# Patient Record
Sex: FEMALE | Race: BLACK OR AFRICAN AMERICAN | NOT HISPANIC OR LATINO | Employment: FULL TIME | URBAN - NONMETROPOLITAN AREA
[De-identification: names, ages, dates, MRNs, and addresses within clinical notes are randomized per-mention and may not be internally consistent; named-entity substitution may affect disease eponyms.]

---

## 2018-02-13 ENCOUNTER — DOCUMENTATION ONLY (OUTPATIENT)
Dept: FAMILY MEDICINE | Facility: OTHER | Age: 23
End: 2018-02-13

## 2018-02-13 PROBLEM — Z97.5 NEXPLANON IN PLACE: Status: ACTIVE | Noted: 2017-05-02

## 2018-02-13 PROBLEM — Z78.9 NONIMMUNE TO HEPATITIS B VIRUS: Status: ACTIVE | Noted: 2017-05-04

## 2018-02-13 PROBLEM — F51.04 PSYCHOPHYSIOLOGICAL INSOMNIA: Status: ACTIVE | Noted: 2017-03-14

## 2018-02-13 PROBLEM — F31.9 BIPOLAR I DISORDER (H): Status: ACTIVE | Noted: 2017-03-14

## 2018-02-13 PROBLEM — Z72.51 HIGH RISK SEXUAL BEHAVIOR: Status: ACTIVE | Noted: 2017-05-02

## 2018-02-13 RX ORDER — IBUPROFEN 200 MG
200 TABLET ORAL 4 TIMES DAILY PRN
COMMUNITY
Start: 2016-11-09

## 2021-09-10 ENCOUNTER — NURSE TRIAGE (OUTPATIENT)
Dept: ADMINISTRATIVE | Facility: CLINIC | Age: 26
End: 2021-09-10

## 2021-09-12 ENCOUNTER — OFFICE VISIT (OUTPATIENT)
Dept: FAMILY MEDICINE | Facility: OTHER | Age: 26
End: 2021-09-12
Attending: PHYSICIAN ASSISTANT

## 2021-09-12 VITALS
HEART RATE: 63 BPM | HEIGHT: 62 IN | BODY MASS INDEX: 29.26 KG/M2 | TEMPERATURE: 99 F | WEIGHT: 159 LBS | RESPIRATION RATE: 18 BRPM | DIASTOLIC BLOOD PRESSURE: 84 MMHG | SYSTOLIC BLOOD PRESSURE: 130 MMHG | OXYGEN SATURATION: 99 %

## 2021-09-12 DIAGNOSIS — J06.9 UPPER RESPIRATORY TRACT INFECTION, UNSPECIFIED TYPE: Primary | ICD-10-CM

## 2021-09-12 PROCEDURE — 99203 OFFICE O/P NEW LOW 30 MIN: CPT | Performed by: PHYSICIAN ASSISTANT

## 2021-09-12 PROCEDURE — C9803 HOPD COVID-19 SPEC COLLECT: HCPCS

## 2021-09-12 PROCEDURE — U0005 INFEC AGEN DETEC AMPLI PROBE: HCPCS | Mod: ZL | Performed by: PHYSICIAN ASSISTANT

## 2021-09-12 RX ORDER — ALBUTEROL SULFATE 90 UG/1
2 AEROSOL, METERED RESPIRATORY (INHALATION) EVERY 6 HOURS PRN
Qty: 8.5 G | Refills: 0 | Status: SHIPPED | OUTPATIENT
Start: 2021-09-12 | End: 2022-11-29

## 2021-09-12 RX ORDER — BENZONATATE 200 MG/1
200 CAPSULE ORAL 3 TIMES DAILY PRN
Qty: 30 CAPSULE | Refills: 0 | Status: SHIPPED | OUTPATIENT
Start: 2021-09-12 | End: 2022-11-29

## 2021-09-12 RX ORDER — ESCITALOPRAM OXALATE 10 MG/1
TABLET ORAL
COMMUNITY
Start: 2020-09-25 | End: 2022-11-29

## 2021-09-12 RX ORDER — ALBUTEROL SULFATE 90 UG/1
2 AEROSOL, METERED RESPIRATORY (INHALATION)
COMMUNITY
Start: 2020-07-13 | End: 2022-11-29

## 2021-09-12 ASSESSMENT — PAIN SCALES - GENERAL: PAINLEVEL: MODERATE PAIN (5)

## 2021-09-12 ASSESSMENT — MIFFLIN-ST. JEOR: SCORE: 1429.47

## 2021-09-12 NOTE — NURSING NOTE
Patient presents to clinic experienicing productive cough, sinus drainage, fatigue and chest congestion for past week.   Medication Reconciliation: complete    Brianna Bazan LPN

## 2021-09-13 NOTE — PROGRESS NOTES
ASSESSMENT/PLAN:    I have reviewed the nursing notes.  I have reviewed the findings, diagnosis, plan and need for follow up with the patient.    1. Upper respiratory tract infection, unspecified type  - albuterol (PROAIR HFA/PROVENTIL HFA/VENTOLIN HFA) 108 (90 Base) MCG/ACT inhaler; Inhale 2 puffs into the lungs every 6 hours as needed for shortness of breath / dyspnea or wheezing  Dispense: 8.5 g; Refill: 0  - Symptomatic COVID-19 Virus (Coronavirus) by PCR Nose  - benzonatate (TESSALON) 200 MG capsule; Take 1 capsule (200 mg) by mouth 3 times daily as needed for cough  Dispense: 30 capsule; Refill: 0  - Vital signs stable. PE consistent with URI. COVID test in process at this time, we recommend that they remain in quarantine until results return, which typically takes 24-72 hours. If COVID testing is negative, symptoms are improving (no need for antipyretics, etc.), that patient can return to normal ADLs, if COVID test returns positive, recommend quarantine for 10-14 days from symptom onset. Recommend: increased fluids, rest, use of humidifier, antitussives (cough medication), alternating tylenol and ibuprofen every 4-6 hours as needed (if able to take these medications), salt water gargles for sore throats or throat lozenges, honey, netti pots/saline rinses for sinus/congestion, as well as other home remedies.     Patient is in agreement and understanding of the above treatment plan. All questions and concerns were addressed and answered to patient's satisfaction. AVS reviewed with patient.     Discussed warning signs/symptoms indicative of need to f/u    Follow up if symptoms persist or worsen or concerns    I explained my diagnostic considerations and recommendations to the patient, who voiced understanding and agreement with the treatment plan. All questions were answered. We discussed potential side effects of any prescribed or recommended therapies, as well as expectations for response to  treatments.    Jessica Rollins PA-C  9/12/2021  7:22 PM    HPI:    Deysi Moss is a 25 year old female  who presents to [facility] today for concerns of URI, x 1 week    Symptoms:  Yes fevers or chills. Fever, highest reported temperature: low grade  No sore throat/pharyngitis/tonsillitis.   Yes allergy/URI Symptoms  No Muffled Sounds/Change in Hearing  No Sensation of Fullness in Ear(s)  No Ringing in Ears/Tinnitus  No Balance Changes  No Dizziness  Yes Congestion (head/nasal/chest)  Yes Cough/Productive Cough  No Post Nasal Drip  Yes Headache  Yes Sinus Pain/Pressure  No Myalgias  No Otalgia  Activity Level Changes: Yes: fatigue  Appetite/Liquid Intake Changes: No  Changes to Bowel Habits: No  Changes to Bladder Habits: No  Additional Symptoms to Report: No  History of similar symptoms: No  Prior workup: No    Treatments tried: Tylenol/Ibuprofen, Decongestants, OTC Cough med, Fluids and Rest    Site of exposure: not known.  Type of exposure: not known    Cardiopulmonary History:  Recent Infections (Pneumonia, etc): No  Asthma: No  Additional History: No  Other Pertinent History: seasonal allergies    PCP: Not local    Past Medical History:   Diagnosis Date     Urinary tract infection     No Comments Provided     Past Surgical History:   Procedure Laterality Date     OTHER SURGICAL HISTORY      54625.0,PAST SURGICAL HISTORY,Unremarkable     Social History     Tobacco Use     Smoking status: Never Smoker     Smokeless tobacco: Never Used     Tobacco comment: Quit smoking: Mother smoked when she was younger; no longer exposed   Substance Use Topics     Alcohol use: Yes     Alcohol/week: 0.0 standard drinks     Comment: Alcoholic Drinks/day: 1-2 glasses of wine per month     Current Outpatient Medications   Medication Sig Dispense Refill     albuterol (PROAIR HFA/PROVENTIL HFA/VENTOLIN HFA) 108 (90 Base) MCG/ACT inhaler Inhale 2 puffs into the lungs       cholecalciferol (D3-50) 81073 UNITS capsule Take  "50,000 Units by mouth once a week       escitalopram (LEXAPRO) 10 MG tablet TK 1 T PO QD       etonogestrel (NEXPLANON) 68 MG IMPL 1 each (68 mg) by Subdermal route continuous       ibuprofen (ADVIL/MOTRIN) 200 MG tablet Take 200 mg by mouth 4 times daily as needed       melatonin 5 MG tablet Take 5 mg by mouth nightly as needed for sleep       Allergies   Allergen Reactions     Cats Shortness Of Breath     Past medical history, past surgical history, current medications and allergies reviewed and accurate to the best of my knowledge.      ROS:  Refer to HPI    /84 (BP Location: Left arm, Patient Position: Sitting, Cuff Size: Adult Regular)   Pulse 63   Temp 99  F (37.2  C) (Tympanic)   Resp 18   Ht 1.575 m (5' 2\")   Wt 72.1 kg (159 lb)   SpO2 99%   Breastfeeding No   BMI 29.08 kg/m      EXAM:  General Appearance: Well appearing 25-year old female, appropriate appearance for age. No acute distress  Ears: Left TM intact, translucent with bony landmarks appreciated, no erythema, no effusion, no bulging, no purulence.  Right TM intact, translucent with bony landmarks appreciated, no erythema, no effusion, no bulging, no purulence.  Left auditory canal clear.  Right auditory canal clear.  Normal external ears, non tender.  Eyes: conjunctivae normal without erythema or irritation, corneas clear, no drainage or crusting, no eyelid swelling, pupils equal   Orophayrnx: moist mucous membranes, posterior pharynx without erythema, tonsils without hypertrophy, no erythema, no exudates or petechiae, no post nasal drip seen, no trismus, voice clear.    Sinuses:  No sinus tenderness upon palpation of the frontal or maxillary sinuses  Nose:  Bilateral nares: no erythema, no edema, no drainage or congestion   Neck: supple without adenopathy  Respiratory: normal chest wall and respirations.  Normal effort.  Clear to auscultation bilaterally, no wheezing, crackles or rhonchi.  No increased work of breathing.  Dry cough. "   Cardiac: RRR with no murmurs  Abdomen: soft, nontender, no rigidity, no rebound tenderness or guarding, normal bowel sounds present  :  No suprapubic tenderness to palpation.  No CVA tenderness to palpation.    Musculoskeletal:  Equal movement of bilateral upper extremities.  Equal movement of bilateral lower extremities.  Normal gait.    Dermatological: no rashes noted of exposed skin  Psychological: normal affect, alert, oriented, and pleasant.     Labs:  COVID in process    Xray:  None

## 2021-09-13 NOTE — PATIENT INSTRUCTIONS
"If a COVID swab was performed:   Please quarantine until results return.     -If \"NEGATIVE\" and symptoms improving (without need for medication) you are able to return to work/activities of daily limit    -If \"POSITIVE\" please quarantine for 10-14 days from symptom onset    Please refer to your AVS for follow up and pain/symptoms management recommendations (I.e.: medications, helpful conservative treatment modalities, appropriate follow up if need to a specialist or family practice, etc.). Please return to urgent care if your symptoms change or worsen.     Discharge instructions:  -If you were prescribed a medication(s), please take this as prescribed/directed  -Monitor your symptoms, if changing/worsening, return to UC/ER or PCP for follow up    Symptomatic treatments recommended.  -Discussed that antibiotics would not help symptoms of viral URI. Education provided on symptoms of secondary bacterial infection such as new fever, chills, rigors, shortness of breath, increased work of breathing, that can occur with viral URI and need for further evaluation, if they occur.   - Ensure you are staying hydrated by drinking plenty of fluids and eating mild foods and advance diet as tolerated  - Honey can be soothing for sore throat (as long as above 12 months of age)  - Warm salt water gurgles can help soothe sore throat  - Humidifier can help with congestion and help keep mucus membranes such as throat and nose from drying out.  - Sleeping slightly propped up can help with congestion and postnasal drainage that can worsen cough at bedtime.  - As long as you have never been told to take Tylenol and/or Ibuprofen you can use them to manage fever and body aches per package instructions  Make sure you eat when you take ibuprofen to avoid stomach upset.  - OTC cough medications per package instructions to help with cough. Check to see if the cough/cold medication already has acetaminophen (Tylenol) in it. If it does avoid " taking additional Tylenol.  - If sudden onset of new fever, worsening symptoms return for further evaluation.  - OTC antihistamine such as Allegra, Zyrtec, Claritin (generic is okay) can help with nasal/sinus congestion and OTC nasal steroid such as Flonase can help decrease sinus inflammation to help with congestion.  - Education provided on symptoms of post-viral bacterial infections including ear infection and pneumonia. This would require re-evaluation for treatment.

## 2021-09-14 ENCOUNTER — TELEPHONE (OUTPATIENT)
Dept: FAMILY MEDICINE | Facility: OTHER | Age: 26
End: 2021-09-14

## 2021-09-14 ENCOUNTER — NURSE TRIAGE (OUTPATIENT)
Dept: FAMILY MEDICINE | Facility: OTHER | Age: 26
End: 2021-09-14

## 2021-09-14 LAB — SARS-COV-2 RNA RESP QL NAA+PROBE: POSITIVE

## 2021-09-14 NOTE — TELEPHONE ENCOUNTER
Coronavirus (COVID-19) Notification    Reason for call  Notify of POSITIVE  COVID-19 lab result, assess symptoms,  review [facility] recommendations    Lab Result   Lab test for 2019-nCoV rRt-PCR or SARS-COV-2 PCR  Oropharyngeal AND/OR nasopharyngeal swabs were POSITIVE for 2019-nCoV RNA [OR] SARS-COV-2 RNA (COVID-19) RNA     We have been unable to reach Patient by phone at this time to notify of their Positive COVID-19 result.  Left voicemail message requesting a call back to [phone] [facility] for results.        POSITIVE COVID-19 Letter sent.    Melanie Cuellar LPN

## 2021-09-14 NOTE — TELEPHONE ENCOUNTER
Reason for Disposition    [1] COVID-19 diagnosed by positive lab test AND [2] mild symptoms (e.g., cough, fever, others) AND [3] no complications or SOB    Additional Information    Negative: SEVERE difficulty breathing (e.g., struggling for each breath, speaks in single words)    Negative: Difficult to awaken or acting confused (e.g., disoriented, slurred speech)    Negative: Bluish (or gray) lips or face now    Negative: Shock suspected (e.g., cold/pale/clammy skin, too weak to stand, low BP, rapid pulse)    Negative: Sounds like a life-threatening emergency to the triager    Negative: [1] COVID-19 exposure AND [2] has not completed COVID-19 vaccine series AND [3] no symptoms    Negative: [1] COVID-19 exposure AND [2] completed COVID-19 vaccine series (fully vaccinated) AND [3] no symptoms    Negative: COVID-19 vaccine reaction suspected (e.g., fever, headache, muscle aches) occurring during days 1-3 after getting vaccine    Negative: COVID-19 vaccine, questions about    Negative: [1] COVID-19 vaccine series completed (fully vaccinated) in past 3 months AND [2] new-onset of COVID-19 symptoms BUT [3] no known exposure    Negative: [1] Had lab test confirmed COVID-19 infection within last 3 monthsAND [2] new-onset of COVID-19 symptoms BUT [3] no known exposure    Negative: [1] Lives with someone known to have influenza (flu test positive) AND [2] flu-like symptoms (e.g., cough, runny nose, sore throat, SOB; with or without fever)    Negative: [1] Adult with possible COVID-19 symptoms AND [2] triager concerned about severity of symptoms or other causes    Negative: COVID-19 and breastfeeding, questions about    Negative: SEVERE or constant chest pain or pressure (Exception: mild central chest pain, present only when coughing)    Negative: MODERATE difficulty breathing (e.g., speaks in phrases, SOB even at rest, pulse 100-120)    Negative: [1] Headache AND [2] stiff neck (can't touch chin to chest)    Negative:  MILD difficulty breathing (e.g., minimal/no SOB at rest, SOB with walking, pulse <100)    Negative: Chest pain or pressure    Negative: Patient sounds very sick or weak to the triager    Negative: Fever > 103 F (39.4 C)    Negative: [1] Fever > 101 F (38.3 C) AND [2] age > 60 years    Negative: [1] Fever > 100.0 F (37.8 C) AND [2] bedridden (e.g., nursing home patient, CVA, chronic illness, recovering from surgery)    Negative: [1] HIGH RISK patient (e.g., age > 64 years, diabetes, heart or lung disease, weak immune system) AND [2] new or worsening symptoms    Negative: [1] HIGH RISK patient AND [2] influenza is widespread in the community AND [3] ONE OR MORE respiratory symptoms: cough, sore throat, runny or stuffy nose    Negative: [1] HIGH RISK patient AND [2] influenza exposure within the last 7 days AND [3] ONE OR MORE respiratory symptoms: cough, sore throat, runny or stuffy nose    Negative: Fever present > 3 days (72 hours)    Negative: [1] Fever returns after gone for over 24 hours AND [2] symptoms worse or not improved    Negative: [1] Continuous (nonstop) coughing interferes with work or school AND [2] no improvement using cough treatment per protocol    Negative: [1] COVID-19 infection suspected by caller or triager AND [2] mild symptoms (cough, fever, or others) AND [3] no complications or SOB    Negative: Cough present > 3 weeks    Protocols used: CORONAVIRUS (COVID-19) DIAGNOSED OR [id number] 3.25

## 2021-09-14 NOTE — TELEPHONE ENCOUNTER
"-Coronavirus (COVID-19) Notification    Caller Name (Patient, parent, daughter/son, grandparent, etc)  Patient     Reason for call  Notify of Positive Coronavirus (COVID-19) lab results, assess symptoms,  review  [facility] recommendations    Lab Result    Lab test:  2019-nCoV rRt-PCR or SARS-CoV-2 PCR    Oropharyngeal AND/OR nasopharyngeal swabs is POSITIVE for 2019-nCoV RNA/SARS-COV-2 PCR (COVID-19 virus)    RN Recommendations/Instructions per [facility] Coronavirus COVID-19 recommendations    Brief introduction script  Introduce self then review script:  \"I am calling on behalf of "[employer].  We were notified that your Coronavirus test (COVID-19) for was POSITIVE for the virus.  I have some information to relay to you but first I wanted to mention that the [place] Dept of Health will be contacting you shortly [it's possible MD already called Patient] to talk to you more about how you are feeling and other people you have had contact with who might now also have the virus.  Also,  [employer] is Partnering with the [facility] for Covid-19 research, you may be contacted directly by research staff.\"    Assessment (Inquire about Patient's current symptoms)   Assessment   Current Symptoms at time of phone call: (if no symptoms, document No symptoms] Cough   Symptoms onset (if applicable) 2 weeks ago     If at time of call, Patients symptoms hare worsened, the Patient should contact 911 or have someone drive them to Emergency Dept promptly:      If Patient calling 911, inform 911 personal that you have tested positive for the Coronavirus (COVID-19).  Place mask on and await 911 to arrive.    If Emergency Dept, If possible, please have another adult drive you to the Emergency Dept but you need to wear mask when in contact with other people.      Monoclonal Antibody Administration    You may be eligible to receive a new treatment with a monoclonal antibody for preventing hospitalization in " "patients at high risk for complications from COVID-19.   This medication is still experimental and available on a limited basis; it is given through an IV and must be given at an infusion center. Please note that not all people who are eligible will receive the medication since it is in limited supply.     Are you interested in being considered for this medication?  No.   Does the patient fit the criteria: No    If patient qualifies based on above criteria:  \"You will be contacted if you are selected to receive this treatment in the next 1-2 business days.   This is time sensitive and if you are not selected in the next 1-2 business days, you will not receive the medication.  If you do not receive a call to schedule, you have not been selected.\"      Review information with Patient    Your result was positive. This means you have COVID-19 (coronavirus).  We have sent you a letter that reviews the information that I'll be reviewing with you now.    How can I protect others?    If you have symptoms: stay home and away from others (self-isolate) until:    You've had no fever--and no medicine that reduces fever--for 1 full day (24 hours). And       Your other symptoms have gotten better. For example, your cough or breathing has improved. And     At least 10 days have passed since your symptoms started. (If you've been told by a doctor that you have a weak immune system, wait 20 days.)     If you don't have symptoms: Stay home and away from others (self-isolate) until at least 10 days have passed since your first positive COVID-19 test. (Date test collected)    During this time:    Stay in your own room, including for meals. Use your own bathroom if you can.    Stay away from others in your home. No hugging, kissing or shaking hands. No visitors.     Don't go to work, school or anywhere else.     Clean  high touch  surfaces often (doorknobs, counters, handles, etc.). Use a household cleaning spray or wipes. You'll find a " full list on the EPA website at www.epa.gov/pesticide-registration/list-n-disinfectants-use-against-sars-cov-2.     Cover your mouth and nose with a mask, tissue or other face covering to avoid spreading germs.    Wash your hands and face often with soap and water.    Make a list of people you have been in close contact with recently, even if either of you wore a face covering.   ; Start your list from 2 days before you became ill or had a positive test.  ; Include anyone that was within 6 feet of you for a cumulative total of 15 minutes or more in 24 hours. (Example: if you sat next to Yash for 5 minutes in the morning and 10 minutes in the afternoon, then you were in close contact for 15 minutes total that day. Yash would be added to your list.)    A public health worker will call or text you. It is important that you answer. They will ask you questions about possible exposures to COVID-19, such as people you have been in direct contact with and places you have visited.    Tell the people on your list that you have COVID-19; they should stay away from others for 14 days starting from the last time they were in contact with you (unless you are told something different from a public health worker).     Caregivers in these groups are at risk for severe illness due to COVID-19:  o People 65 years and older  o People who live in a nursing home or long-term care facility  o People with chronic disease (lung, heart, cancer, diabetes, kidney, liver, immunologic)  o People who have a weakened immune system, including those who:  - Are in cancer treatment  - Take medicine that weakens the immune system, such as corticosteroids  - Had a bone marrow or organ transplant  - Have an immune deficiency  - Have poorly controlled HIV or AIDS  - Are obese (body mass index of 40 or higher)  - Smoke regularly    Caregivers should wear gloves while washing dishes, handling laundry and cleaning bedrooms and bathrooms.    Wash and dry  laundry with special caution. Don't shake dirty laundry, and use the warmest water setting you can.    If you have a weakened immune system, ask your doctor about other actions you should take.    For more tips, go to www.cdc.gov/coronavirus/2019-ncov/downloads/10Things.pdf.    You should not go back to work until you meet the guidelines above for ending your home isolation. You don't need to be retested for COVID-19 before going back to work--studies show that you won't spread the virus if it's been at least 10 days since your symptoms started (or 20 days, if you have a weak immune system).    Employers: This document serves as formal notice of your employee's medical guidelines for going back to work. They must meet the above guidelines before going back to work in person.    How can I take care of myself?    1. Get lots of rest. Drink extra fluids (unless a doctor has told you not to).    2. Take Tylenol (acetaminophen) for fever or pain. If you have liver or kidney problems, ask your family doctor if it's okay to take Tylenol.     Take either:     650 mg (two 325 mg pills) every 4 to 6 hours, or     1,000 mg (two 500 mg pills) every 8 hours as needed.     Note: Don't take more than 3,000 mg in one day. Acetaminophen is found in many medicines (both prescribed and over-the-counter medicines). Read all labels to be sure you don't take too much.    For children, check the Tylenol bottle for the right dose (based on their age or weight).    3. If you have other health problems (like cancer, heart failure, an organ transplant or severe kidney disease): Call your specialty clinic if you don't feel better in the next 2 days.    4. Know when to call 911: Emergency warning signs include:    Trouble breathing or shortness of breath    Pain or pressure in the chest that doesn't go away    Feeling confused like you haven't felt before, or not being able to wake up    Bluish-colored lips or face    5. Sign up for [facility]  [facility]. We know it's scary to hear that you have COVID-19. We want to track your symptoms to make sure you're okay over the next 2 weeks. Please look for an email from Radha Hanna--this is a free, online program that we'll use to keep in touch. To sign up, follow the link in the email. Learn more at [email or web address].    Where can I get more information?    [facility]: [email or web address]    Coronavirus Basics: www.health[facility]./diseases/coronavirus/basics.html    What to Do If You're Sick: www.cdc.gov/coronavirus/2019-ncov/about/steps-when-sick.html    Ending Home Isolation: www.cdc.gov/coronavirus/2019-ncov/hcp/disposition-in-home-patients.html     Caring for Someone with COVID-19: www.cdc.gov/coronavirus/2019-ncov/if-you-are-sick/care-for-someone.html     [facility] clinical trials (COVID-19 research studies): [email or web address]-[email or web address]-[email or web address]     A Positive COVID-19 letter will be sent via [employer] or the mail. (Exception, no letters sent to Presurgerical/Preprocedure Patients)    Anabelle Walsh LPN

## 2021-10-09 ENCOUNTER — HEALTH MAINTENANCE LETTER (OUTPATIENT)
Age: 26
End: 2021-10-09

## 2022-09-17 ENCOUNTER — HEALTH MAINTENANCE LETTER (OUTPATIENT)
Age: 27
End: 2022-09-17

## 2022-11-29 ENCOUNTER — OFFICE VISIT (OUTPATIENT)
Dept: PEDIATRICS | Facility: OTHER | Age: 27
End: 2022-11-29
Attending: INTERNAL MEDICINE
Payer: COMMERCIAL

## 2022-11-29 VITALS
HEART RATE: 52 BPM | HEIGHT: 63 IN | BODY MASS INDEX: 27.41 KG/M2 | WEIGHT: 154.7 LBS | DIASTOLIC BLOOD PRESSURE: 76 MMHG | TEMPERATURE: 97.1 F | OXYGEN SATURATION: 98 % | SYSTOLIC BLOOD PRESSURE: 110 MMHG | RESPIRATION RATE: 16 BRPM

## 2022-11-29 DIAGNOSIS — Z13.220 LIPID SCREENING: ICD-10-CM

## 2022-11-29 DIAGNOSIS — Z13.1 SCREENING FOR DIABETES MELLITUS: ICD-10-CM

## 2022-11-29 DIAGNOSIS — F39 MOOD DISORDER (H): ICD-10-CM

## 2022-11-29 DIAGNOSIS — F41.1 GAD (GENERALIZED ANXIETY DISORDER): ICD-10-CM

## 2022-11-29 DIAGNOSIS — Z13.29 SCREENING FOR THYROID DISORDER: ICD-10-CM

## 2022-11-29 DIAGNOSIS — Z76.89 ENCOUNTER TO ESTABLISH CARE: Primary | ICD-10-CM

## 2022-11-29 DIAGNOSIS — Z13.0 SCREENING, ANEMIA, DEFICIENCY, IRON: ICD-10-CM

## 2022-11-29 DIAGNOSIS — Z23 NEED FOR VACCINATION: ICD-10-CM

## 2022-11-29 DIAGNOSIS — E55.9 VITAMIN D INSUFFICIENCY: ICD-10-CM

## 2022-11-29 PROBLEM — F31.9 BIPOLAR I DISORDER (H): Status: RESOLVED | Noted: 2017-03-14 | Resolved: 2022-11-29

## 2022-11-29 LAB
ANION GAP SERPL CALCULATED.3IONS-SCNC: 7 MMOL/L (ref 7–15)
BUN SERPL-MCNC: 13.5 MG/DL (ref 6–20)
CALCIUM SERPL-MCNC: 9.4 MG/DL (ref 8.6–10)
CHLORIDE SERPL-SCNC: 103 MMOL/L (ref 98–107)
CHOLEST SERPL-MCNC: 218 MG/DL
CREAT SERPL-MCNC: 0.77 MG/DL (ref 0.51–0.95)
DEPRECATED HCO3 PLAS-SCNC: 26 MMOL/L (ref 22–29)
ERYTHROCYTE [DISTWIDTH] IN BLOOD BY AUTOMATED COUNT: 12.2 % (ref 10–15)
GFR SERPL CREATININE-BSD FRML MDRD: >90 ML/MIN/1.73M2
GLUCOSE SERPL-MCNC: 94 MG/DL (ref 70–99)
HBA1C MFR BLD: 5.5 % (ref 4–6.2)
HCT VFR BLD AUTO: 38 % (ref 35–47)
HDLC SERPL-MCNC: 73 MG/DL
HGB BLD-MCNC: 12.4 G/DL (ref 11.7–15.7)
LDLC SERPL CALC-MCNC: 95 MG/DL
MCH RBC QN AUTO: 27.7 PG (ref 26.5–33)
MCHC RBC AUTO-ENTMCNC: 32.6 G/DL (ref 31.5–36.5)
MCV RBC AUTO: 85 FL (ref 78–100)
NONHDLC SERPL-MCNC: 145 MG/DL
PLATELET # BLD AUTO: 223 10E3/UL (ref 150–450)
POTASSIUM SERPL-SCNC: 4.3 MMOL/L (ref 3.4–5.3)
RBC # BLD AUTO: 4.48 10E6/UL (ref 3.8–5.2)
SODIUM SERPL-SCNC: 136 MMOL/L (ref 136–145)
TRIGL SERPL-MCNC: 250 MG/DL
TSH SERPL DL<=0.005 MIU/L-ACNC: 0.67 UIU/ML (ref 0.3–4.2)
WBC # BLD AUTO: 5.1 10E3/UL (ref 4–11)

## 2022-11-29 PROCEDURE — 85027 COMPLETE CBC AUTOMATED: CPT | Mod: ZL | Performed by: INTERNAL MEDICINE

## 2022-11-29 PROCEDURE — 82306 VITAMIN D 25 HYDROXY: CPT | Mod: ZL | Performed by: INTERNAL MEDICINE

## 2022-11-29 PROCEDURE — 99214 OFFICE O/P EST MOD 30 MIN: CPT | Mod: 25 | Performed by: INTERNAL MEDICINE

## 2022-11-29 PROCEDURE — 83036 HEMOGLOBIN GLYCOSYLATED A1C: CPT | Mod: ZL | Performed by: INTERNAL MEDICINE

## 2022-11-29 PROCEDURE — 84443 ASSAY THYROID STIM HORMONE: CPT | Mod: ZL | Performed by: INTERNAL MEDICINE

## 2022-11-29 PROCEDURE — 91305 COVID-19 VACCINE 12+ (PFIZER): CPT | Performed by: INTERNAL MEDICINE

## 2022-11-29 PROCEDURE — 0052A COVID-19 VACCINE 12+ (PFIZER): CPT | Performed by: INTERNAL MEDICINE

## 2022-11-29 PROCEDURE — 80048 BASIC METABOLIC PNL TOTAL CA: CPT | Mod: ZL | Performed by: INTERNAL MEDICINE

## 2022-11-29 PROCEDURE — 80061 LIPID PANEL: CPT | Mod: ZL | Performed by: INTERNAL MEDICINE

## 2022-11-29 PROCEDURE — 90686 IIV4 VACC NO PRSV 0.5 ML IM: CPT | Performed by: INTERNAL MEDICINE

## 2022-11-29 PROCEDURE — 36415 COLL VENOUS BLD VENIPUNCTURE: CPT | Mod: ZL | Performed by: INTERNAL MEDICINE

## 2022-11-29 PROCEDURE — 90471 IMMUNIZATION ADMIN: CPT | Performed by: INTERNAL MEDICINE

## 2022-11-29 RX ORDER — ESCITALOPRAM OXALATE 10 MG/1
TABLET ORAL
Qty: 90 TABLET | Refills: 3 | Status: SHIPPED | OUTPATIENT
Start: 2022-11-29 | End: 2022-11-29

## 2022-11-29 RX ORDER — ESCITALOPRAM OXALATE 20 MG/1
TABLET ORAL
Qty: 90 TABLET | Refills: 3 | Status: SHIPPED | OUTPATIENT
Start: 2022-11-29 | End: 2022-11-29

## 2022-11-29 RX ORDER — GABAPENTIN 300 MG/1
300 CAPSULE ORAL 3 TIMES DAILY
COMMUNITY
End: 2022-11-29

## 2022-11-29 RX ORDER — AMOXICILLIN 500 MG
1 CAPSULE ORAL DAILY
COMMUNITY
End: 2023-06-27

## 2022-11-29 RX ORDER — ESCITALOPRAM OXALATE 10 MG/1
10 TABLET ORAL DAILY
Qty: 90 TABLET | Refills: 3 | Status: SHIPPED | OUTPATIENT
Start: 2022-11-29 | End: 2023-06-14

## 2022-11-29 RX ORDER — GABAPENTIN 300 MG/1
300 CAPSULE ORAL 3 TIMES DAILY
Qty: 270 CAPSULE | Refills: 3 | Status: SHIPPED | OUTPATIENT
Start: 2022-11-29

## 2022-11-29 RX ORDER — ESCITALOPRAM OXALATE 20 MG/1
20 TABLET ORAL DAILY
Qty: 90 TABLET | Refills: 3 | Status: SHIPPED | OUTPATIENT
Start: 2022-11-29 | End: 2023-06-14

## 2022-11-29 ASSESSMENT — ENCOUNTER SYMPTOMS
PARESTHESIAS: 0
ABDOMINAL PAIN: 0
NAUSEA: 0
HEADACHES: 0
SHORTNESS OF BREATH: 0
DYSURIA: 0
DIARRHEA: 0
PALPITATIONS: 0
NERVOUS/ANXIOUS: 0
FEVER: 0
JOINT SWELLING: 0
WEAKNESS: 0
FREQUENCY: 0
HEARTBURN: 0
DIZZINESS: 0
ARTHRALGIAS: 0
MYALGIAS: 0
EYE PAIN: 0
CONSTIPATION: 0
CHILLS: 0
HEMATURIA: 0
HEMATOCHEZIA: 0
SORE THROAT: 0
COUGH: 0

## 2022-11-29 ASSESSMENT — ANXIETY QUESTIONNAIRES
4. TROUBLE RELAXING: SEVERAL DAYS
7. FEELING AFRAID AS IF SOMETHING AWFUL MIGHT HAPPEN: NOT AT ALL
8. IF YOU CHECKED OFF ANY PROBLEMS, HOW DIFFICULT HAVE THESE MADE IT FOR YOU TO DO YOUR WORK, TAKE CARE OF THINGS AT HOME, OR GET ALONG WITH OTHER PEOPLE?: SOMEWHAT DIFFICULT
1. FEELING NERVOUS, ANXIOUS, OR ON EDGE: NOT AT ALL
GAD7 TOTAL SCORE: 5
7. FEELING AFRAID AS IF SOMETHING AWFUL MIGHT HAPPEN: NOT AT ALL
3. WORRYING TOO MUCH ABOUT DIFFERENT THINGS: SEVERAL DAYS
GAD7 TOTAL SCORE: 5
6. BECOMING EASILY ANNOYED OR IRRITABLE: SEVERAL DAYS
IF YOU CHECKED OFF ANY PROBLEMS ON THIS QUESTIONNAIRE, HOW DIFFICULT HAVE THESE PROBLEMS MADE IT FOR YOU TO DO YOUR WORK, TAKE CARE OF THINGS AT HOME, OR GET ALONG WITH OTHER PEOPLE: SOMEWHAT DIFFICULT
GAD7 TOTAL SCORE: 5
5. BEING SO RESTLESS THAT IT IS HARD TO SIT STILL: SEVERAL DAYS
2. NOT BEING ABLE TO STOP OR CONTROL WORRYING: SEVERAL DAYS

## 2022-11-29 ASSESSMENT — PATIENT HEALTH QUESTIONNAIRE - PHQ9
SUM OF ALL RESPONSES TO PHQ QUESTIONS 1-9: 6
SUM OF ALL RESPONSES TO PHQ QUESTIONS 1-9: 6
10. IF YOU CHECKED OFF ANY PROBLEMS, HOW DIFFICULT HAVE THESE PROBLEMS MADE IT FOR YOU TO DO YOUR WORK, TAKE CARE OF THINGS AT HOME, OR GET ALONG WITH OTHER PEOPLE: SOMEWHAT DIFFICULT

## 2022-11-29 ASSESSMENT — PAIN SCALES - GENERAL: PAINLEVEL: NO PAIN (0)

## 2022-11-29 NOTE — PROGRESS NOTES
Assessment & Plan   1. Encounter to establish care  I told her I was happy to establish care with her.  Given that she is a 26-year-old female she may choose to establish with family medicine.  However she if she continues to see me I will refer her to OB/GYN for women's health exams.    2. ARNOLD (generalized anxiety disorder)  At goal, no change.  - gabapentin (NEURONTIN) 300 MG capsule; Take 1 capsule (300 mg) by mouth 3 times daily  Dispense: 270 capsule; Refill: 3    3. Mood disorder (H)  At goal, no change.  - gabapentin (NEURONTIN) 300 MG capsule; Take 1 capsule (300 mg) by mouth 3 times daily  Dispense: 270 capsule; Refill: 3    4. Screening, anemia, deficiency, iron  - CBC with platelets; Future  - CBC with platelets    5. Screening for thyroid disorder  - TSH with free T4 reflex; Future  - TSH with free T4 reflex    6. Vitamin D insufficiency  - Vitamin D Deficiency; Future  - Vitamin D Deficiency    7. Lipid screening  - Lipid Profile; Future  - Lipid Profile    8. Screening for diabetes mellitus  - Hemoglobin A1c; Future  - Basic metabolic panel; Future  - Basic metabolic panel  - Hemoglobin A1c    9. Need for vaccination  - COVID-19 VACCINE 12+ (PFIZER)  - INFLUENZA VACCINE >6 MONTHS (AFLURIA/FLUZONE)      Patient Instructions      -- Consider establish with Family Med vs OB/Gyn     -- Eat more fruits and veggies   -- Start metamucil 1 tbsp in at least 8 oz water daily   -- Cut back on caffeine   -- Cut back on fast food     -- GEMA for last doctor notes x 3 and vaccine log    [place] counselors/therapists   Telephone Hours Kids? Address   [facility]  (Many counselors) [phone] M-Th 8-5  F 8-12 Yes [address]   [email or web address]   [facility]  (Many counselors) [phone]  Yes [address]   [email or web address]   [facility]  (Many counselors) [phone]  Yes [address]  [email or web address]   [facility]  Shankar Mitchell [phone]  Yes [facility]  [address]  [email or web address]   [facility]  Wicho Essex [phone]  Yes [address]   [address]  [email or web address]   Rachel Lucas [phone]   [address]  [email or web address]   [facility]  Bronwyn Walkeryn Shefali [phone]   [address]  [email or web address] [email or web address]   Lacy Vigil [phone]   [address]   Chelsea Margie [phone]   [address] [place] [address]   Nikky Segovia [phone]  Yes [address]   Anaid Mccracken [phone]   [address]    Kenny Rei [phone]   [address] [place] [address]   Deisy Burgos [phone]   10   [address] [place], [place] [address]t   [email or web address]   Karma Lopez [phone]   [address] [place] [address] [address]  ([facility])  [email or web address]   [facility]  Eran Morel [phone]   [address] [place] [address] Counseling  Michele Rinne Cindy Thomas [phone]      [facility] [place] [phone]  Yes LEONARD Mariscal  [address]  [email or web address]   [facility] [place] [phone]  Yes LEONARD Cleaning  [address]  [email or web address]           Return in about 1 year (around 11/29/2023), or if symptoms worsen or fail to improve, for med management.    Signed, Faraz Mendoza MD, FAAP, FACP  Internal Medicine & Pediatrics    Subjective   Deysi Moss is a 26 year old female who presents for [facility] primary care.  Previous physician was in [place].  She is previously from [place] but just recently moved back home because it was too expensive in [place].  She has had mental health conditions throughout her life.  She has had 2 hospitalizations in her life most recently in March 2022.  Previously  "it was thought that she may have had bipolar disorder although now she is not so sure.  She has found significant benefit from taking Lexapro although it is affecting her libido.  She is finding it difficult to work out because of 2 jobs.    Objective   Vitals: /76   Pulse 52   Temp 97.1  F (36.2  C) (Tympanic)   Resp 16   Ht 1.588 m (5' 2.5\")   Wt 70.2 kg (154 lb 11.2 oz)   LMP 10/20/2022   SpO2 98%   Breastfeeding No   BMI 27.84 kg/m      General: well appearing  HEENT: Tympanic membranes are normal  Neck: No JVD, no bruits  CV: Regular rate and rhythm, no murmur, rub or gallop  Pulm: Clear to auscultation bilaterally, no wheezing, rales or rhonchi  Neuro: Grossly intact  Musculoskeletal: No lower extremity edema  Skin: No rash  Psychiatry: Normal affect and insight.      "

## 2022-11-29 NOTE — TELEPHONE ENCOUNTER
Balloon catheter inserted.   ON RUNTHROUGH TO CIRC / OM Per pharmacy - Insurance limits lexapro to 1 tablet per day of each strength. Please send scripts for 1 tablet of 10 mg and 20 mg per day.    Medications teed up.    Norma J. Gosselin, LPN .......  11/29/2022  3:05 PM

## 2022-11-29 NOTE — PATIENT INSTRUCTIONS
-- Consider establish with Family Med vs OB/Gyn     -- Eat more fruits and veggies   -- Start metamucil 1 tbsp in at least 8 oz water daily   -- Cut back on caffeine   -- Cut back on fast food     -- GEMA for last doctor notes x 3 and vaccine log    [place] counselors/therapists   Telephone Hours Kids? Address   [facility]  (Many counselors) [phone] M-Th 8-5  F 8-12 Yes [address]   [email or web address]://[email or web address]   [facility]  (Many counselors) [phone]  Yes [address]   [email or web address]   [facility]  (Many counselors) [phone]  Yes [facility] [place]  http://[email or web address]   [facility]  Shankar Mitchell [phone]  Yes [facility]  [address]  [email or web address]   [facility]  Wicho Segovia [phone]  Yes [address]   [address]  [email or web address]   Rachel Lucas [phone]   [address]  [email or web address]   [facility]  Loc Meehan [phone]   [address]  [email or web address]   Lacy Vigil [phone]   [address]   Chelsea Hanson [phone]   [address] [place] [address]   Nikky Luca [phone]  Yes [address]   Anaid Mccracken [phone]   [address] [place]    Kenny Minaya [phone]   [address] [place] [address]   Deisy Burgos [phone]   10   [address] [facility] [address]   [email or web address]://[email or web address].[email or web address]   Karmasom Lopez [phone]   [address] [place] [address] [address]  ([facility])  [email or web address]   [facility]  Eran Morel [phone]   [address] [place] [address]  Michele Rinne Cindy Thomas [phone]      [facility] [place] [phone]  Yes LEONARD Mariscal  [address] [place] [address]  [email or web address]://[email or web address]   [facility] [place] [phone]  Yes LOENARD Cleaning  [address] [facility]  http://[email or web address]/

## 2022-11-29 NOTE — NURSING NOTE
"Chief Complaint   Patient presents with     Recheck Medication     Establish Care         Initial /76   Pulse 52   Temp 97.1  F (36.2  C) (Tympanic)   Resp 16   Ht 1.588 m (5' 2.5\")   Wt 70.2 kg (154 lb 11.2 oz)   LMP 10/20/2022   SpO2 98%   Breastfeeding No   BMI 27.84 kg/m   Estimated body mass index is 27.84 kg/m  as calculated from the following:    Height as of this encounter: 1.588 m (5' 2.5\").    Weight as of this encounter: 70.2 kg (154 lb 11.2 oz).         Norma J. Gosselin, LPN   "

## 2022-11-30 LAB — DEPRECATED CALCIDIOL+CALCIFEROL SERPL-MC: 19 UG/L (ref 20–75)

## 2022-12-07 ENCOUNTER — MYC MEDICAL ADVICE (OUTPATIENT)
Dept: PEDIATRICS | Facility: OTHER | Age: 27
End: 2022-12-07

## 2023-06-13 ENCOUNTER — TELEPHONE (OUTPATIENT)
Dept: PEDIATRICS | Facility: OTHER | Age: 28
End: 2023-06-13
Payer: COMMERCIAL

## 2023-06-13 NOTE — TELEPHONE ENCOUNTER
Patient called and requested a call back regarding a string of bumps on left breast. Patient wonders if she should be seen or what it could mean? Patient stated she worked out and went in to the shower and could feel a string of prominent bumps on left breast. Patient stated the bumps are under the skin.    Patient request nurse leave a voicemail and it is okay to sent a note on Mchart.    Okay to leave detailed message.    Kelly Cook on 6/13/2023 at 10:24 AM

## 2023-06-13 NOTE — TELEPHONE ENCOUNTER
Left message to schedule an appointment to be seen for the bumps.  Norma J. Gosselin, LPN .......  6/13/2023  2:10 PM

## 2023-06-14 ENCOUNTER — OFFICE VISIT (OUTPATIENT)
Dept: FAMILY MEDICINE | Facility: OTHER | Age: 28
End: 2023-06-14
Attending: PHYSICIAN ASSISTANT
Payer: COMMERCIAL

## 2023-06-14 VITALS
SYSTOLIC BLOOD PRESSURE: 112 MMHG | HEART RATE: 70 BPM | RESPIRATION RATE: 16 BRPM | OXYGEN SATURATION: 98 % | WEIGHT: 150 LBS | TEMPERATURE: 98.6 F | HEIGHT: 62 IN | DIASTOLIC BLOOD PRESSURE: 68 MMHG | BODY MASS INDEX: 27.6 KG/M2

## 2023-06-14 DIAGNOSIS — Z00.00 ENCOUNTER FOR MEDICAL EXAMINATION TO ESTABLISH CARE: ICD-10-CM

## 2023-06-14 DIAGNOSIS — N63.32 MASS OF AXILLARY TAIL OF LEFT BREAST: Primary | ICD-10-CM

## 2023-06-14 PROCEDURE — G0463 HOSPITAL OUTPT CLINIC VISIT: HCPCS

## 2023-06-14 PROCEDURE — 99213 OFFICE O/P EST LOW 20 MIN: CPT | Performed by: PHYSICIAN ASSISTANT

## 2023-06-14 RX ORDER — DROSPIRENONE AND ETHINYL ESTRADIOL 0.03MG-3MG
1 KIT ORAL DAILY
COMMUNITY
Start: 2023-05-10 | End: 2023-08-18

## 2023-06-14 ASSESSMENT — PAIN SCALES - GENERAL: PAINLEVEL: NO PAIN (0)

## 2023-06-14 NOTE — NURSING NOTE
Chief Complaint   Patient presents with     Mass     Lumps in left Breast        FOOD SECURITY SCREENING QUESTIONS:    The next two questions are to help us understand your food security.  If you are feeling you need any assistance in this area, we have resources available to support you today.    Hunger Vital Signs:  Within the past 12 months we worried whether our food would run out before we got money to buy more. Never  Within the past 12 months the food we bought just didn't last and we didn't have money to get more. Never  Saba Segura LPN on 6/14/2023 at 8:56 AM        Medication Reconciliation: complete    Saba Segura

## 2023-06-14 NOTE — PROGRESS NOTES
Assessment & Plan     1. Mass of axillary tail of left breast  - US Breast Left Limited 1-3 Quadrants; Future  - Breast ultrasound ordered for further evaluation, I believe that this is likely fibrocystic tissue, however, I still recommend the ultrasound for completeness. She will be contacted to schedule the ultrasound, if she does not hear by early Monday/Tuesday next week, she should reach out for assistance. Strict return precautions reviewed. Patient is in agreement and understanding of the above treatment plan. All questions and concerns were addressed and answered to patient's satisfaction. AVS reviewed with patient.     2. Encounter for medical examination to establish care  - Encounter to establish with new PCP.     See Patient Instructions    Return for Imaging ordered, you will be contacted to schedule.    Jessica Rollins PA-C  [facility]    Subjective   Deysi is a 27 year old, presenting for the following health issues:  Mass (Lumps in left Breast )        6/14/2023     8:52 AM   Additional Questions   Roomed by Saba REBOLLAR   Accompanied by Self     Mass    History of Present Illness       Reason for visit:  I felt a string of lumps on my left breast  that were almost visible through the skin  Symptom onset:  1-3 days ago  Symptoms include:  Lumps on breast and fatigue  Symptom intensity:  Mild  Symptom progression:  Improving  Had these symptoms before:  No    She eats 0-1 servings of fruits and vegetables daily.She consumes 1 sweetened beverage(s) daily.She exercises with enough effort to increase her heart rate 10 to 19 minutes per day.  She exercises with enough effort to increase her heart rate 4 days per week.   She is taking medications regularly.     Deysi presents today for concerns of lump in left breast, near armpit region. She noted this after working out yesterday, while in the shower. There is no tenderness, warmth, discharge/drainage, no skin changes (coloration,  "dimpling, etc.). Last menstrual period occurred last week and just ended. No vaginal discharge. No unintentional weight loss/gain, hair loss/gain, no fevers or chills. No personal or familial history of breast/ovarian/cervical cancer. No history of similar symptoms in the past.     Review of Systems   Constitutional, HEENT, cardiovascular, pulmonary, GI, , musculoskeletal, neuro, skin, endocrine and psych systems are negative, except as otherwise noted.      Objective    /68   Pulse 70   Temp 98.6  F (37  C) (Tympanic)   Resp 16   Ht 1.575 m (5' 2\")   Wt 68 kg (150 lb)   LMP 06/04/2023 (Exact Date)   SpO2 98%   BMI 27.44 kg/m    Body mass index is 27.44 kg/m .  Physical Exam   GENERAL: healthy, alert and no distress  RESP: lungs clear to auscultation - no rales, rhonchi or wheezes  BREAST:     Left: + axillary findings: small - 1 cm fibrocystic region which is non tender, no dermatitis, no erythema, no galactorrhea/nipple discharge, no nipple inversion, no skin dimpling, no warmth.     Right; fibrocystic changes, no tenderness, no tenderness, discharge, erythema, dimpling or nipple changes.   CV: regular rate and rhythm, normal S1 S2, no S3 or S4, no murmur, click or rub, no peripheral edema and peripheral pulses strong  PSYCH: mentation appears normal, affect normal/bright          "

## 2023-06-27 ENCOUNTER — OFFICE VISIT (OUTPATIENT)
Dept: FAMILY MEDICINE | Facility: OTHER | Age: 28
End: 2023-06-27
Attending: PHYSICIAN ASSISTANT
Payer: COMMERCIAL

## 2023-06-27 VITALS
SYSTOLIC BLOOD PRESSURE: 124 MMHG | TEMPERATURE: 96.8 F | RESPIRATION RATE: 12 BRPM | HEART RATE: 60 BPM | WEIGHT: 152.2 LBS | BODY MASS INDEX: 28.01 KG/M2 | DIASTOLIC BLOOD PRESSURE: 62 MMHG | HEIGHT: 62 IN

## 2023-06-27 DIAGNOSIS — L42 PITYRIASIS ROSEA: Primary | ICD-10-CM

## 2023-06-27 DIAGNOSIS — Z23 ENCOUNTER FOR IMMUNIZATION: ICD-10-CM

## 2023-06-27 PROBLEM — Z97.5 NEXPLANON IN PLACE: Status: RESOLVED | Noted: 2017-05-02 | Resolved: 2023-06-27

## 2023-06-27 PROBLEM — Z72.51 HIGH RISK SEXUAL BEHAVIOR: Status: RESOLVED | Noted: 2017-05-02 | Resolved: 2023-06-27

## 2023-06-27 PROCEDURE — G0463 HOSPITAL OUTPT CLINIC VISIT: HCPCS | Mod: 25

## 2023-06-27 PROCEDURE — 90472 IMMUNIZATION ADMIN EACH ADD: CPT

## 2023-06-27 PROCEDURE — G0010 ADMIN HEPATITIS B VACCINE: HCPCS

## 2023-06-27 PROCEDURE — 99213 OFFICE O/P EST LOW 20 MIN: CPT | Performed by: PHYSICIAN ASSISTANT

## 2023-06-27 RX ORDER — BENZOCAINE/MENTHOL 6 MG-10 MG
LOZENGE MUCOUS MEMBRANE 2 TIMES DAILY
Qty: 120 G | Refills: 1 | Status: SHIPPED | OUTPATIENT
Start: 2023-06-27

## 2023-06-27 ASSESSMENT — PAIN SCALES - GENERAL: PAINLEVEL: NO PAIN (0)

## 2023-06-27 NOTE — NURSING NOTE
Patient presents to clinic with concern about having ringworm. She states that she has spots on body that started 3 weeks ago and it is spreading.  Nicole Yu LPN ....................  6/27/2023   8:08 AM  EXT [id number]

## 2023-06-27 NOTE — PROGRESS NOTES
"  Assessment & Plan     1. Pityriasis rosea  Differential includes pityriasis rosea, tinea infection, tinea versicolor, environmental exposure, dermatitis, etc.  Symptoms and exam consistent with pityriasis rosea.  Recommend continued symptomatic management with topical cortisone cream, follow-up therapy.  Discussed symptoms may take a few months to fully resolve.  If symptoms worsen or change in the meantime follow-up for additional evaluation.  - hydrocortisone (CORTAID) 1 % external cream; Apply topically 2 times daily  Dispense: 120 g; Refill: 1    2. Encounter for immunization  - TDAP 10-64Y (ADACEL,BOOSTRIX)  - GH IMM-  HEPATITIS B VACCINE, ADULT, IM      BMI:   Estimated body mass index is 27.84 kg/m  as calculated from the following:    Height as of this encounter: 1.575 m (5' 2\").    Weight as of this encounter: 69 kg (152 lb 3.2 oz).   Weight management plan: Discussed healthy diet and exercise guidelines      No follow-ups on file.    Quyen Koch PA-C  [facility] is a 27 year old, presenting for the following health issues:  Derm Problem      History of Present Illness       She eats 0-1 servings of fruits and vegetables daily.She consumes 1 sweetened beverage(s) daily.She exercises with enough effort to increase her heart rate 10 to 19 minutes per day.  She exercises with enough effort to increase her heart rate 4 days per week.   She is taking medications regularly.     Here for evaluation of a rash.  Reports she initially noticed 1 larger spot on her abdomen approximately 3 weeks ago.  It has since been spreading to smaller similar erythematous spots throughout her abdominal region, chest, now a couple spots on her right forearm.  She had tried using over-the-counter cortisone cream which did help with the itching but did not prevent spreading or improvement of the rash.  Reports she discussed with her family who thought it could be ringworm so she " "also tried over-the-counter antifungal cream without change.  No new foods, medications, body soaps, laundry soaps, clothing, environmental exposures.    PAST MEDICAL HISTORY:   Past Medical History:   Diagnosis Date     Urinary tract infection     No Comments Provided       PAST SURGICAL HISTORY:   Past Surgical History:   Procedure Laterality Date     OTHER SURGICAL HISTORY      64806.0,PAST SURGICAL HISTORY,Unremarkable       FAMILY HISTORY:   Family History   Problem Relation Age of Onset     Other - See Comments Mother         Psychiatric illness,Bi-Polar/Psychiatric illness,Depression     Cerebrovascular Disease Mother      Mental Illness Father      Alcoholism Father      Breast Cancer No family hx of      Colon Cancer No family hx of        SOCIAL HISTORY:   Social History     Tobacco Use     Smoking status: Never     Smokeless tobacco: Never     Tobacco comments:     Quit smoking: Mother smoked when she was younger; no longer exposed   Substance Use Topics     Alcohol use: Yes     Alcohol/week: 0.0 standard drinks of alcohol     Comment: Alcoholic Drinks/day: 1-2 glasses of wine per month        Allergies   Allergen Reactions     Cats Shortness Of Breath     Current Outpatient Medications   Medication     drospirenone-ethinyl estradiol (ALLI) 3-0.03 MG tablet     FLUoxetine (PROZAC) 20 MG capsule     gabapentin (NEURONTIN) 300 MG capsule     hydrocortisone (CORTAID) 1 % external cream     ibuprofen (ADVIL/MOTRIN) 200 MG tablet     melatonin 5 MG tablet     vitamin D3 (CHOLECALCIFEROL) 1.25 MG (14035 UT) capsule     Omega-3 Fatty Acids (FISH OIL) 1200 MG capsule     No current facility-administered medications for this visit.         Review of Systems   Per HPI        Objective    /62   Pulse 60   Temp 96.8  F (36  C)   Resp 12   Ht 1.575 m (5' 2\")   Wt 69 kg (152 lb 3.2 oz)   LMP 06/04/2023 (Exact Date)   BMI 27.84 kg/m    Body mass index is 27.84 kg/m .  Physical Exam   General: Pleasant, in " no apparent distress.  Skin: Scattered erythematous, dry patches with larger herald patch to abdominal region with few scattered spots to chest and right forearm.  Psych: Appropriate mood and affect.

## 2023-07-10 ENCOUNTER — HOSPITAL ENCOUNTER (OUTPATIENT)
Dept: ULTRASOUND IMAGING | Facility: OTHER | Age: 28
Discharge: HOME | End: 2023-07-10
Attending: PHYSICIAN ASSISTANT
Payer: COMMERCIAL

## 2023-07-10 DIAGNOSIS — N63.32 MASS OF AXILLARY TAIL OF LEFT BREAST: ICD-10-CM

## 2023-07-10 PROCEDURE — 76642 ULTRASOUND BREAST LIMITED: CPT | Mod: LT

## 2023-08-18 ENCOUNTER — OFFICE VISIT (OUTPATIENT)
Dept: OBGYN | Facility: OTHER | Age: 28
End: 2023-08-18
Payer: COMMERCIAL

## 2023-08-18 VITALS
HEART RATE: 51 BPM | SYSTOLIC BLOOD PRESSURE: 96 MMHG | BODY MASS INDEX: 26.74 KG/M2 | HEIGHT: 63 IN | OXYGEN SATURATION: 99 % | TEMPERATURE: 97 F | RESPIRATION RATE: 16 BRPM | WEIGHT: 150.9 LBS | DIASTOLIC BLOOD PRESSURE: 66 MMHG

## 2023-08-18 DIAGNOSIS — Z30.09 BIRTH CONTROL COUNSELING: ICD-10-CM

## 2023-08-18 DIAGNOSIS — Z12.4 CERVICAL CANCER SCREENING: Primary | ICD-10-CM

## 2023-08-18 DIAGNOSIS — Z11.3 SCREEN FOR STD (SEXUALLY TRANSMITTED DISEASE): ICD-10-CM

## 2023-08-18 LAB
C TRACH DNA SPEC QL PROBE+SIG AMP: NEGATIVE
N GONORRHOEA DNA SPEC QL NAA+PROBE: NEGATIVE

## 2023-08-18 PROCEDURE — 87591 N.GONORRHOEAE DNA AMP PROB: CPT | Mod: ZL

## 2023-08-18 PROCEDURE — G0123 SCREEN CERV/VAG THIN LAYER: HCPCS

## 2023-08-18 PROCEDURE — 99203 OFFICE O/P NEW LOW 30 MIN: CPT

## 2023-08-18 PROCEDURE — G0463 HOSPITAL OUTPT CLINIC VISIT: HCPCS

## 2023-08-18 RX ORDER — DROSPIRENONE AND ETHINYL ESTRADIOL 0.03MG-3MG
1 KIT ORAL DAILY
Qty: 84 TABLET | Refills: 3 | Status: SHIPPED | OUTPATIENT
Start: 2023-08-18 | End: 2024-08-02

## 2023-08-18 RX ORDER — VITAMIN B COMPLEX
TABLET ORAL DAILY
COMMUNITY

## 2023-08-18 ASSESSMENT — PAIN SCALES - GENERAL: PAINLEVEL: NO PAIN (0)

## 2023-08-18 NOTE — NURSING NOTE
"Chief Complaint   Patient presents with    Contraception     Wants birth control refill       Initial BP 96/66   Pulse 51   Temp 97  F (36.1  C) (Temporal)   Resp 16   Ht 1.588 m (5' 2.5\")   Wt 68.4 kg (150 lb 14.4 oz)   LMP 08/16/2023 (Exact Date)   SpO2 99%   Breastfeeding No   BMI 27.16 kg/m   Estimated body mass index is 27.16 kg/m  as calculated from the following:    Height as of this encounter: 1.588 m (5' 2.5\").    Weight as of this encounter: 68.4 kg (150 lb 14.4 oz).  Medication Reconciliation: complete    FOOD SECURITY SCREENING QUESTIONS  Hunger Vital Signs:  Within the past 12 months we worried whether our food would run out before we got money to buy more. Sometimes  Within the past 12 months the food we bought just didn't last and we didn't have money to get more. Sometimes        Advance care directive on file? no  Advance care directive provided to patient? declined     Erinn Mendieta LPN    "

## 2023-08-18 NOTE — PROGRESS NOTES
"Follow-Up Visit    S: Ms. Deysi Moss is a 27 year old No obstetric history on file. here for birth control refill. She is liking her OCP . She denies any new smoking, clotting disorders or concerns for BP. She reports that she is doing well otherwise. She is due for GC and PAP today and is in agreement do to this.    O:  BP 96/66   Pulse 51   Temp 97  F (36.1  C) (Temporal)   Resp 16   Ht 1.588 m (5' 2.5\")   Wt 68.4 kg (150 lb 14.4 oz)   LMP 08/16/2023 (Exact Date)   SpO2 99%   Breastfeeding No   BMI 27.16 kg/m    Gen: Well-appearing, NAD  Pulm: nonlabored  Pelvic:  Normal appearing external female genitalia. Normal hair distribution. Vagina is without lesions. Small brown discharge. Cervix normal, no lesions, no cervical motion tenderness. Uterus is small, mobile, non-tender. No adnexal tenderness or masses. PAP & GC collected.     A/P:  Ms. Deysi Moss is a 27 year old No obstetric history on file. here for birth control refill. She is liking her OCP so this was refilled for one year. Will notify her of results of pap and GC.   - follow up in one year     JOSE Henderson  8/18/2023 9:23 AM    "

## 2023-08-23 LAB
BKR LAB AP GYN ADEQUACY: NORMAL
BKR LAB AP GYN INTERPRETATION: NORMAL
BKR LAB AP HPV REFLEX: NORMAL
BKR LAB AP PREVIOUS ABNORMAL: NORMAL
PATH REPORT.COMMENTS IMP SPEC: NORMAL
PATH REPORT.COMMENTS IMP SPEC: NORMAL
PATH REPORT.RELEVANT HX SPEC: NORMAL

## 2024-02-04 ENCOUNTER — OFFICE VISIT (OUTPATIENT)
Dept: FAMILY MEDICINE | Facility: OTHER | Age: 29
End: 2024-02-04

## 2024-02-04 ENCOUNTER — HOSPITAL ENCOUNTER (OUTPATIENT)
Dept: GENERAL RADIOLOGY | Facility: OTHER | Age: 29
Discharge: HOME | End: 2024-02-04

## 2024-02-04 VITALS
HEIGHT: 62 IN | SYSTOLIC BLOOD PRESSURE: 126 MMHG | DIASTOLIC BLOOD PRESSURE: 82 MMHG | TEMPERATURE: 98.5 F | OXYGEN SATURATION: 98 % | BODY MASS INDEX: 27.58 KG/M2 | WEIGHT: 149.9 LBS | HEART RATE: 80 BPM | RESPIRATION RATE: 16 BRPM

## 2024-02-04 DIAGNOSIS — R10.13 ABDOMINAL PAIN, EPIGASTRIC: Primary | ICD-10-CM

## 2024-02-04 DIAGNOSIS — R10.13 ABDOMINAL PAIN, EPIGASTRIC: ICD-10-CM

## 2024-02-04 LAB
ALBUMIN SERPL BCG-MCNC: 4 G/DL (ref 3.5–5.2)
ALBUMIN UR-MCNC: 30 MG/DL
ALP SERPL-CCNC: 76 U/L (ref 40–150)
ALT SERPL W P-5'-P-CCNC: 22 U/L (ref 0–50)
AMYLASE SERPL-CCNC: 38 U/L (ref 28–100)
ANION GAP SERPL CALCULATED.3IONS-SCNC: 9 MMOL/L (ref 7–15)
APPEARANCE UR: CLEAR
AST SERPL W P-5'-P-CCNC: 31 U/L (ref 0–45)
BACTERIA #/AREA URNS HPF: ABNORMAL /HPF
BASOPHILS # BLD AUTO: 0 10E3/UL (ref 0–0.2)
BASOPHILS NFR BLD AUTO: 0 %
BILIRUB SERPL-MCNC: 0.2 MG/DL
BILIRUB UR QL STRIP: NEGATIVE
BUN SERPL-MCNC: 11.5 MG/DL (ref 6–20)
CALCIUM SERPL-MCNC: 9.1 MG/DL (ref 8.6–10)
CHLORIDE SERPL-SCNC: 100 MMOL/L (ref 98–107)
COLOR UR AUTO: YELLOW
CREAT SERPL-MCNC: 0.8 MG/DL (ref 0.51–0.95)
CRP SERPL-MCNC: 27.57 MG/L
DEPRECATED HCO3 PLAS-SCNC: 26 MMOL/L (ref 22–29)
EGFRCR SERPLBLD CKD-EPI 2021: >90 ML/MIN/1.73M2
EOSINOPHIL # BLD AUTO: 0 10E3/UL (ref 0–0.7)
EOSINOPHIL NFR BLD AUTO: 0 %
ERYTHROCYTE [DISTWIDTH] IN BLOOD BY AUTOMATED COUNT: 12.8 % (ref 10–15)
GLUCOSE SERPL-MCNC: 98 MG/DL (ref 70–99)
GLUCOSE UR STRIP-MCNC: NEGATIVE MG/DL
HCG UR QL: NEGATIVE
HCT VFR BLD AUTO: 35.3 % (ref 35–47)
HGB BLD-MCNC: 11.7 G/DL (ref 11.7–15.7)
HGB UR QL STRIP: NEGATIVE
IMM GRANULOCYTES # BLD: 0 10E3/UL
IMM GRANULOCYTES NFR BLD: 0 %
KETONES UR STRIP-MCNC: NEGATIVE MG/DL
LEUKOCYTE ESTERASE UR QL STRIP: NEGATIVE
LIPASE SERPL-CCNC: 18 U/L (ref 13–60)
LYMPHOCYTES # BLD AUTO: 1.1 10E3/UL (ref 0.8–5.3)
LYMPHOCYTES NFR BLD AUTO: 24 %
MCH RBC QN AUTO: 27.5 PG (ref 26.5–33)
MCHC RBC AUTO-ENTMCNC: 33.1 G/DL (ref 31.5–36.5)
MCV RBC AUTO: 83 FL (ref 78–100)
MONOCYTES # BLD AUTO: 0.5 10E3/UL (ref 0–1.3)
MONOCYTES NFR BLD AUTO: 11 %
MUCOUS THREADS #/AREA URNS LPF: PRESENT /LPF
NEUTROPHILS # BLD AUTO: 3.1 10E3/UL (ref 1.6–8.3)
NEUTROPHILS NFR BLD AUTO: 65 %
NITRATE UR QL: NEGATIVE
NRBC # BLD AUTO: 0 10E3/UL
NRBC BLD AUTO-RTO: 0 /100
PH UR STRIP: 6 [PH] (ref 5–9)
PLATELET # BLD AUTO: 203 10E3/UL (ref 150–450)
POTASSIUM SERPL-SCNC: 4.1 MMOL/L (ref 3.4–5.3)
PROT SERPL-MCNC: 7.5 G/DL (ref 6.4–8.3)
RBC # BLD AUTO: 4.25 10E6/UL (ref 3.8–5.2)
RBC URINE: 1 /HPF
SODIUM SERPL-SCNC: 135 MMOL/L (ref 135–145)
SP GR UR STRIP: 1.04 (ref 1–1.03)
UROBILINOGEN UR STRIP-MCNC: NORMAL MG/DL
WBC # BLD AUTO: 4.8 10E3/UL (ref 4–11)
WBC URINE: 3 /HPF

## 2024-02-04 PROCEDURE — 81001 URINALYSIS AUTO W/SCOPE: CPT | Mod: ZL

## 2024-02-04 PROCEDURE — 82150 ASSAY OF AMYLASE: CPT | Mod: ZL

## 2024-02-04 PROCEDURE — 86140 C-REACTIVE PROTEIN: CPT | Mod: ZL

## 2024-02-04 PROCEDURE — 99214 OFFICE O/P EST MOD 30 MIN: CPT

## 2024-02-04 PROCEDURE — 36415 COLL VENOUS BLD VENIPUNCTURE: CPT | Mod: ZL

## 2024-02-04 PROCEDURE — 80053 COMPREHEN METABOLIC PANEL: CPT | Mod: ZL

## 2024-02-04 PROCEDURE — 85025 COMPLETE CBC W/AUTO DIFF WBC: CPT | Mod: ZL

## 2024-02-04 PROCEDURE — 81025 URINE PREGNANCY TEST: CPT | Mod: ZL

## 2024-02-04 PROCEDURE — 250N000013 HC RX MED GY IP 250 OP 250 PS 637

## 2024-02-04 PROCEDURE — 83690 ASSAY OF LIPASE: CPT | Mod: ZL

## 2024-02-04 PROCEDURE — 74018 RADEX ABDOMEN 1 VIEW: CPT

## 2024-02-04 PROCEDURE — 87507 IADNA-DNA/RNA PROBE TQ 12-25: CPT

## 2024-02-04 RX ORDER — MAGNESIUM HYDROXIDE/ALUMINUM HYDROXICE/SIMETHICONE 120; 1200; 1200 MG/30ML; MG/30ML; MG/30ML
15 SUSPENSION ORAL ONCE
Status: COMPLETED | OUTPATIENT
Start: 2024-02-04 | End: 2024-02-04

## 2024-02-04 RX ADMIN — ALUMINUM HYDROXIDE, MAGNESIUM HYDROXIDE, AND SIMETHICONE 15 ML: 200; 200; 20 SUSPENSION ORAL at 13:07

## 2024-02-04 ASSESSMENT — PAIN SCALES - GENERAL: PAINLEVEL: MODERATE PAIN (5)

## 2024-02-04 NOTE — NURSING NOTE
"Chief Complaint   Patient presents with    Abdominal Pain     Upper middle/ center     Shoulder Pain     Right; pain when breathing        Initial /82   Pulse 80   Temp 98.5  F (36.9  C) (Tympanic)   Resp 16   Ht 1.581 m (5' 2.25\")   Wt 68 kg (149 lb 14.4 oz)   LMP 01/20/2024 (Exact Date)   SpO2 98%   Breastfeeding No   BMI 27.20 kg/m   Estimated body mass index is 27.2 kg/m  as calculated from the following:    Height as of this encounter: 1.581 m (5' 2.25\").    Weight as of this encounter: 68 kg (149 lb 14.4 oz).  Medication Review: complete    The next two questions are to help us understand your food security.  If you are feeling you need any assistance in this area, we have resources available to support you today.          2/4/2024   SDOH- Food Insecurity   Within the past 12 months, did you worry that your food would run out before you got money to buy more? N   Within the past 12 months, did the food you bought just not last and you didn t have money to get more? N         Health Care Directive:  Patient does not have a Health Care Directive or Living Will: Discussed advance care planning with patient; however, patient declined at this time.    Qing Baires CMA        "

## 2024-02-04 NOTE — NURSING NOTE
Clinic Administered Medication Documentation        Patient was given Maalox 15 mL. Prior to medication administration, verified patient's identity using patient s name and date of birth. Please see MAR and medication order for additional information. Patient instructed to remain in clinic for 15 minutes and report any adverse reaction to staff immediately.        Autumn Peguero LPN on 2/4/2024 at 1:11 PM

## 2024-02-04 NOTE — PROGRESS NOTES
ASSESSMENT/PLAN:    (R10.13) Abdominal pain, epigastric  (primary encounter diagnosis)  Comment: Has had some epigastric pain for over a week now.  She notes that it started after eating [employer].  It is exacerbated when eating food.  She denies constipation, has had 1 episode of diarrhea 2 days ago.  She notes that normally stools are regular.  She has had some nausea and vomiting.  She denies any fevers or chills.  On exam today vital signs are stable and she is afebrile.  Mild epigastric tenderness.  No guarding.  CBC was obtained and white count was within normal limits.  Aldolase and lipase were within normal limits.  Metabolic panel was stable.  Urine pregnancy was normal.  Urinalysis was negative for infection.  CRP was mildly elevated at 27.  GI cocktail was mildly helpful.  I discussed with patient that due to limitations in the clinic on the weekend ultrasound is not possible at this time, I do recommend H. pylori testing as there is concern for peptic ulcer.  In addition I recommend follow-up with primary care provider.  Differentials include gallbladder pathology, reflux, ulcer, etc.  Plan: UA Macroscopic with reflex to Microscopic and         Culture, CBC and Differential, Comprehensive         Metabolic Panel, Amylase, Lipase, Pregnancy,         Urine (HCG), CRP inflammation, XR Abdomen 1         View, alum & mag hydroxide-simethicone (MAALOX)        suspension 15 mL, Helicobacter pylori Antigen         Stool    Discussed warning signs/symptoms indicative of need to f/u    Follow up if symptoms persist or worsen or concerns    I have reviewed the nursing notes.  I have reviewed the findings, diagnosis, plan and need for follow up with the patient.    A total of 35 minutes was spent with this patient today including history, exam, assessment, education, and documentation.    I explained my diagnostic considerations and recommendations to the patient, who voiced understanding and agreement with the  treatment plan. All questions were answered. We discussed potential side effects of any prescribed or recommended therapies, as well as expectations for response to treatments.    LUKE REDMOND CNP  2/4/2024  11:04 AM    HPI:    Deysi Moss is a 28 year old female  who [facility] for concerns of abdominal pain.    Patient reports that she has some upper abdominal pain and some right-sided shoulder pain when breathing. Ongoing for over a week. She has had fatigue. Pain in epigastrum, mostly after she eats. This started after she ate something from [employer]. Symptoms have been worsening.     abdominal pain.     Subjective:   Pain Location:  epigastric    Presence of the Following:  YES: she endorses  diarrhea 2 days ago  YES: she endorses  nausea last night, decreased appetite today  No vomiting  No fevers, chills  No urinary symptoms  No blood in stool  No mucus in stool    Last Bowel Movement: 2 days ago  Last Urination: Yesterday    Any prior diagnosis of:   No peptic ulcer dz/GERD  No pancreatitis  No appendicitis/appendectomy  No gall bladder pathology (gallstone, cholecystectomy, etc.)  No liver disease/pathology  No renal disease, renal stones, etc.  No diverticulosis/diverticulitis  No IBS/IBD  No diabetes    No prior GI or GYN surgeries (appendectomy, cholecystectomy, hysterectomy, etc.)    Female: LMP 1/20/24, normal for her     No recent travel  No recent antibiotic usage  No sick/ill contact exposure  No recent hospitalization    Tobacco use: No, marijuana use a few times a week, she stopped last weekend.   Alcohol use: Yes, 1-2 times a month (6-7 drinks at a time).     Last meal: Last ate toast around 0900    Last colonoscopy (timeframe, normal/abnormal): None    PCP: GREG Rollins      Past Medical History:   Diagnosis Date    Urinary tract infection     No Comments Provided     Past Surgical History:   Procedure Laterality Date    OTHER SURGICAL HISTORY       "73291.0,PAST SURGICAL HISTORY,Unremarkable     Social History     Tobacco Use    Smoking status: Never     Passive exposure: Past    Smokeless tobacco: Never    Tobacco comments:     Quit smoking: Mother smoked when she was younger; no longer exposed   Substance Use Topics    Alcohol use: Yes     Alcohol/week: 0.0 standard drinks of alcohol     Comment: Alcoholic Drinks/day: 1-2 glasses of wine per month     Current Outpatient Medications   Medication Sig Dispense Refill    drospirenone-ethinyl estradiol (ALLI) 3-0.03 MG tablet Take 1 tablet by mouth daily as directed 84 tablet 3    FLUoxetine (PROZAC) 20 MG capsule take 1 capsule by mouth every morning      gabapentin (NEURONTIN) 300 MG capsule Take 1 capsule (300 mg) by mouth 3 times daily 270 capsule 3    ibuprofen (ADVIL/MOTRIN) 200 MG tablet Take 200 mg by mouth 4 times daily as needed      melatonin 5 MG tablet Take 5 mg by mouth nightly as needed for sleep      Vitamin D3 (VITAMIN D, CHOLECALCIFEROL,) 25 mcg (1000 units) tablet Take by mouth daily      hydrocortisone (CORTAID) 1 % external cream Apply topically 2 times daily (Patient not taking: Reported on 8/18/2023) 120 g 1    vitamin D3 (CHOLECALCIFEROL) 1.25 MG (84248 UT) capsule Take 50,000 Units by mouth once a week (Patient not taking: Reported on 8/18/2023)       Allergies   Allergen Reactions    Cats Shortness Of Breath     Past medical history, past surgical history, current medications and allergies reviewed and accurate to the best of my knowledge.      ROS:  Refer to HPI    /82   Pulse 80   Temp 98.5  F (36.9  C) (Tympanic)   Resp 16   Ht 1.581 m (5' 2.25\")   Wt 68 kg (149 lb 14.4 oz)   LMP 01/20/2024 (Exact Date)   SpO2 98%   Breastfeeding No   BMI 27.20 kg/m      EXAM:  General Appearance: Well appearing 28 year old female, appropriate appearance for age. No acute distress   Ears: Left TM intact, translucent with bony landmarks appreciated, no erythema, no effusion, no bulging, " no purulence.  Right TM intact, translucent with bony landmarks appreciated, no erythema, no effusion, no bulging, no purulence.  Left auditory canal clear.  Right auditory canal clear.  Normal external ears, non tender.  Eyes: conjunctivae normal without erythema or irritation, corneas clear, no drainage or crusting, no eyelid swelling, pupils equal   Oropharynx: moist mucous membranes, posterior pharynx without erythema, no exudates or petechiae, no post nasal drip seen, no trismus, voice clear.    Sinuses:  No sinus tenderness upon palpation of the frontal or maxillary sinuses  Nose:  Bilateral nares: no erythema, no edema, no drainage or congestion   Neck: supple without adenopathy  Respiratory: normal chest wall and respirations.  Normal effort.  Clear to auscultation bilaterally, no wheezing, crackles or rhonchi.  No increased work of breathing.  No cough appreciated.  Cardiac: RRR with no murmurs  Abdomen: soft, mild epigastric tenderness, no rigidity, no rebound tenderness or guarding, normal bowel sounds present  :  No suprapubic tenderness to palpation.  Absent CVA tenderness to palpation.    Musculoskeletal:  Equal movement of bilateral upper extremities.  Equal movement of bilateral lower extremities.  Normal gait.    Dermatological: no rashes noted of exposed skin  Neuro: Alert and oriented to person, place, and time.  Cranial nerves II-XII grossly intact with no focal or lateralizing deficits.  Muscle tone normal.  Gait normal. No tremor.   Psychological: normal affect, alert, oriented, and pleasant.     Labs/Xray:  Results for orders placed or performed in visit on 02/04/24   UA Macroscopic with reflex to Microscopic and Culture     Status: Abnormal    Specimen: Urine, Midstream   Result Value Ref Range    Color Urine Yellow Colorless, Straw, Light Yellow, Yellow    Appearance Urine Clear Clear    Glucose Urine Negative Negative mg/dL    Bilirubin Urine Negative Negative    Ketones Urine Negative  Negative mg/dL    Specific Gravity Urine 1.036 (H) 1.000 - 1.030    Blood Urine Negative Negative    pH Urine 6.0 5.0 - 9.0    Protein Albumin Urine 30 (A) Negative mg/dL    Urobilinogen Urine Normal Normal, 2.0 mg/dL    Nitrite Urine Negative Negative    Leukocyte Esterase Urine Negative Negative    Bacteria Urine Moderate (A) None Seen /HPF    Mucus Urine Present (A) None Seen /LPF    RBC Urine 1 <=2 /HPF    WBC Urine 3 <=5 /HPF    Narrative    Urine Culture not indicated   CRP inflammation     Status: Abnormal   Result Value Ref Range    CRP Inflammation 27.57 (H) <5.00 mg/L   Lipase     Status: Normal   Result Value Ref Range    Lipase 18 13 - 60 U/L   Amylase     Status: Normal   Result Value Ref Range    Amylase 38 28 - 100 U/L   Comprehensive Metabolic Panel     Status: Normal   Result Value Ref Range    Sodium 135 135 - 145 mmol/L    Potassium 4.1 3.4 - 5.3 mmol/L    Carbon Dioxide (CO2) 26 22 - 29 mmol/L    Anion Gap 9 7 - 15 mmol/L    Urea Nitrogen 11.5 6.0 - 20.0 mg/dL    Creatinine 0.80 0.51 - 0.95 mg/dL    GFR Estimate >90 >60 mL/min/1.73m2    Calcium 9.1 8.6 - 10.0 mg/dL    Chloride 100 98 - 107 mmol/L    Glucose 98 70 - 99 mg/dL    Alkaline Phosphatase 76 40 - 150 U/L    AST 31 0 - 45 U/L    ALT 22 0 - 50 U/L    Protein Total 7.5 6.4 - 8.3 g/dL    Albumin 4.0 3.5 - 5.2 g/dL    Bilirubin Total 0.2 <=1.2 mg/dL   CBC with platelets and differential     Status: None   Result Value Ref Range    WBC Count 4.8 4.0 - 11.0 10e3/uL    RBC Count 4.25 3.80 - 5.20 10e6/uL    Hemoglobin 11.7 11.7 - 15.7 g/dL    Hematocrit 35.3 35.0 - 47.0 %    MCV 83 78 - 100 fL    MCH 27.5 26.5 - 33.0 pg    MCHC 33.1 31.5 - 36.5 g/dL    RDW 12.8 10.0 - 15.0 %    Platelet Count 203 150 - 450 10e3/uL    % Neutrophils 65 %    % Lymphocytes 24 %    % Monocytes 11 %    % Eosinophils 0 %    % Basophils 0 %    % Immature Granulocytes 0 %    NRBCs per 100 WBC 0 <1 /100    Absolute Neutrophils 3.1 1.6 - 8.3 10e3/uL    Absolute Lymphocytes  1.1 0.8 - 5.3 10e3/uL    Absolute Monocytes 0.5 0.0 - 1.3 10e3/uL    Absolute Eosinophils 0.0 0.0 - 0.7 10e3/uL    Absolute Basophils 0.0 0.0 - 0.2 10e3/uL    Absolute Immature Granulocytes 0.0 <=0.4 10e3/uL    Absolute NRBCs 0.0 10e3/uL   Pregnancy, Urine (HCG)     Status: Normal   Result Value Ref Range    hCG Urine Qualitative Negative Negative   CBC and Differential     Status: None    Narrative    The following orders were created for panel order CBC and Differential.  Procedure                               Abnormality         Status                     ---------                               -----------         ------                     CBC with platelets and d...[[id number]]                      Final result                 Please view results for these tests on the individual orders.

## 2024-02-05 ENCOUNTER — HOSPITAL ENCOUNTER (EMERGENCY)
Facility: OTHER | Age: 29
Discharge: HOME | End: 2024-02-05
Attending: FAMILY MEDICINE | Admitting: FAMILY MEDICINE

## 2024-02-05 ENCOUNTER — TELEPHONE (OUTPATIENT)
Dept: FAMILY MEDICINE | Facility: OTHER | Age: 29
End: 2024-02-05

## 2024-02-05 ENCOUNTER — APPOINTMENT (OUTPATIENT)
Dept: ULTRASOUND IMAGING | Facility: OTHER | Age: 29
End: 2024-02-05
Attending: FAMILY MEDICINE

## 2024-02-05 VITALS
WEIGHT: 149 LBS | RESPIRATION RATE: 20 BRPM | SYSTOLIC BLOOD PRESSURE: 134 MMHG | DIASTOLIC BLOOD PRESSURE: 85 MMHG | TEMPERATURE: 99.3 F | BODY MASS INDEX: 27.03 KG/M2 | OXYGEN SATURATION: 97 % | HEART RATE: 97 BPM

## 2024-02-05 DIAGNOSIS — R10.13 ABDOMINAL PAIN, EPIGASTRIC: Primary | ICD-10-CM

## 2024-02-05 DIAGNOSIS — R10.13 EPIGASTRIC PAIN: ICD-10-CM

## 2024-02-05 LAB
ADV 40+41 DNA STL QL NAA+NON-PROBE: NEGATIVE
ASTRO TYP 1-8 RNA STL QL NAA+NON-PROBE: NEGATIVE
C CAYETANENSIS DNA STL QL NAA+NON-PROBE: NEGATIVE
CAMPYLOBACTER DNA SPEC NAA+PROBE: NEGATIVE
CRYPTOSP DNA STL QL NAA+NON-PROBE: NEGATIVE
E COLI O157 DNA STL QL NAA+NON-PROBE: NORMAL
E HISTOLYT DNA STL QL NAA+NON-PROBE: NEGATIVE
EAEC ASTA GENE ISLT QL NAA+PROBE: NEGATIVE
EC STX1+STX2 GENES STL QL NAA+NON-PROBE: NEGATIVE
EPEC EAE GENE STL QL NAA+NON-PROBE: NEGATIVE
ETEC LTA+ST1A+ST1B TOX ST NAA+NON-PROBE: NEGATIVE
G LAMBLIA DNA STL QL NAA+NON-PROBE: NEGATIVE
NOROVIRUS GI+II RNA STL QL NAA+NON-PROBE: NEGATIVE
P SHIGELLOIDES DNA STL QL NAA+NON-PROBE: NEGATIVE
RVA RNA STL QL NAA+NON-PROBE: NEGATIVE
SALMONELLA SP RPOD STL QL NAA+PROBE: NEGATIVE
SAPO I+II+IV+V RNA STL QL NAA+NON-PROBE: NEGATIVE
SHIGELLA SP+EIEC IPAH ST NAA+NON-PROBE: NEGATIVE
V CHOLERAE DNA SPEC QL NAA+PROBE: NEGATIVE
VIBRIO DNA SPEC NAA+PROBE: NEGATIVE
Y ENTEROCOL DNA STL QL NAA+PROBE: NEGATIVE

## 2024-02-05 PROCEDURE — 99284 EMERGENCY DEPT VISIT MOD MDM: CPT | Mod: 25 | Performed by: FAMILY MEDICINE

## 2024-02-05 PROCEDURE — 99283 EMERGENCY DEPT VISIT LOW MDM: CPT | Performed by: FAMILY MEDICINE

## 2024-02-05 PROCEDURE — 76705 ECHO EXAM OF ABDOMEN: CPT

## 2024-02-05 PROCEDURE — 250N000013 HC RX MED GY IP 250 OP 250 PS 637: Performed by: FAMILY MEDICINE

## 2024-02-05 PROCEDURE — 87338 HPYLORI STOOL AG IA: CPT | Mod: ZL

## 2024-02-05 RX ORDER — MAGNESIUM HYDROXIDE/ALUMINUM HYDROXICE/SIMETHICONE 120; 1200; 1200 MG/30ML; MG/30ML; MG/30ML
15 SUSPENSION ORAL ONCE
Status: COMPLETED | OUTPATIENT
Start: 2024-02-05 | End: 2024-02-05

## 2024-02-05 RX ORDER — PANTOPRAZOLE SODIUM 40 MG/1
40 TABLET, DELAYED RELEASE ORAL DAILY
Status: DISCONTINUED | OUTPATIENT
Start: 2024-02-05 | End: 2024-02-05 | Stop reason: HOSPADM

## 2024-02-05 RX ORDER — FAMOTIDINE 20 MG/1
20 TABLET, FILM COATED ORAL 2 TIMES DAILY
Status: DISCONTINUED | OUTPATIENT
Start: 2024-02-05 | End: 2024-02-05 | Stop reason: HOSPADM

## 2024-02-05 RX ORDER — PANTOPRAZOLE SODIUM 40 MG/1
40 TABLET, DELAYED RELEASE ORAL DAILY
Qty: 30 TABLET | Refills: 0 | Status: SHIPPED | OUTPATIENT
Start: 2024-02-05 | End: 2024-03-04

## 2024-02-05 RX ADMIN — FAMOTIDINE 20 MG: 20 TABLET ORAL at 18:31

## 2024-02-05 RX ADMIN — ALUMINUM HYDROXIDE, MAGNESIUM HYDROXIDE, AND SIMETHICONE 15 ML: 200; 200; 20 SUSPENSION ORAL at 18:31

## 2024-02-05 RX ADMIN — PANTOPRAZOLE SODIUM 40 MG: 40 TABLET, DELAYED RELEASE ORAL at 18:31

## 2024-02-05 ASSESSMENT — ENCOUNTER SYMPTOMS
CHOKING: 0
JOINT SWELLING: 0
FREQUENCY: 0
FEVER: 0
ABDOMINAL DISTENTION: 0
FLANK PAIN: 0
NAUSEA: 1
DIAPHORESIS: 0
DIARRHEA: 1
SHORTNESS OF BREATH: 0
CHILLS: 0
VOMITING: 0
ABDOMINAL PAIN: 1
COUGH: 0
FATIGUE: 1

## 2024-02-05 ASSESSMENT — ACTIVITIES OF DAILY LIVING (ADL): ADLS_ACUITY_SCORE: 35

## 2024-02-05 NOTE — TELEPHONE ENCOUNTER
Pt is aware of results and all questions answered. Pt dropped off stool sample yesterday evening and order was not placed. Lab has sample and will push through when order is placed.    Brianna John on 2/5/2024 at 10:12 AM         Advancement Flap (Single) Text: The defect edges were debeveled with a #15 scalpel blade.  Given the location of the defect and the proximity to free margins a single advancement flap was deemed most appropriate.  Using a sterile surgical marker, an appropriate advancement flap was drawn incorporating the defect and placing the expected incisions within the relaxed skin tension lines where possible.    The area thus outlined was incised deep to adipose tissue with a #15 scalpel blade.  The skin margins were undermined to an appropriate distance in all directions utilizing iris scissors.

## 2024-02-05 NOTE — TELEPHONE ENCOUNTER
Please contact patient let her know that all of her labs are stable.  Her CRP which is an inflammation marker is slightly elevated but this is suspected with epigastric pain, especially if it may be due to a peptic ulcer or acid reflux.  If patient is still having pain or if it worsens I recommend that she be reevaluated, she may want to be seen in the ED as they can do further imaging or she can schedule an appointment with her PCP. LUKE Redmond, BRIANDA  ....................  2/5/2024   9:58 AM

## 2024-02-05 NOTE — TELEPHONE ENCOUNTER
Patient was seen in the [facility] yesterday, 2/4/24, for stomach pain. The patient had labs done and would like to know the results. Patient said the stomach pain has not gotten any better.       Angie Bergman on 2/5/2024 at 9:16 AM

## 2024-02-05 NOTE — ED TRIAGE NOTES
ED Nursing Triage Note (General)   ________________________________    Deysi Moss is a 28 year old Female that presents to triage via private vehicle with complaints of abdominal pain.  Patient states a month ago she noted pain to her RLQ which resolved, however, came back over the past week.  Patient states she was seen in the clinic yesterday and was told to come to the ER for worsening pain.  Patient states she had blood work, urine, and an xray in the clinic.  Patient states they also collected a stool sample and asked patient for an additional sample which patient states she brought.  No complaints of diarrhea, nausea, or vomiting.  Patient denies correlation with her menstrual cycle and states pain is more in her diaphragm versus pelvis. Patient states pain is associated with increased belching and gas.  Pain is also worsened with eating.  Significant symptoms had onset 3 day(s) ago.  /85   Pulse 97   Temp 98.8  F (37.1  C) (Tympanic)   Resp 20   Wt 67.6 kg (149 lb)   LMP 01/20/2024 (Exact Date)   SpO2 97%   BMI 27.03 kg/m     PRE HOSPITAL PRIOR LIVING SITUATION Alone      Triage Assessment (Adult)       Row Name 02/05/24 3486          Triage Assessment    Airway WDL WDL        Respiratory WDL    Respiratory WDL WDL        Skin Circulation/Temperature WDL    Skin Circulation/Temperature WDL WDL        Cardiac WDL    Cardiac WDL WDL     Cardiac Rhythm NSR        Peripheral/Neurovascular WDL    Peripheral Neurovascular WDL WDL     Capillary Refill, General less than/equal to 3 secs        Cognitive/Neuro/Behavioral WDL    Cognitive/Neuro/Behavioral WDL WDL        Carey Coma Scale    Best Eye Response 4-->(E4) spontaneous     Best Motor Response 6-->(M6) obeys commands     Best Verbal Response 5-->(V5) oriented     [place] Coma Scale Score 15

## 2024-02-06 LAB — H PYLORI AG STL QL IA: NEGATIVE

## 2024-02-06 NOTE — ED PROVIDER NOTES
History     Chief Complaint   Patient presents with    Abdominal Pain     HPI  Deysi Moss is a 28 year old female who presents with persistent abdominal pain.  [facility] for similar symptoms.  Lab workup then was overall reassuring with no significant abnormalities.  Imaging was not done.  Patient states for about the last 1 months she has noticed an increase of epigastric abdominal discomfort.  It does seem worse with certain foods.  She describes it as more of a burning sensation.  She has not vomited.  No blood in the stool.  She has had some increasing diarrheal stools.  She was sent home to do a stool culture which she has collected and brought back in today.  It looks as though there may be working her up for H. pylori.  She denies any recent travel or known sick contacts.  She does admit to increased life stressors including relationship stressors.  She smokes marijuana occasionally but denies any other drug or alcohol use.  She is accompanied by her grandmother who is supportive.  Last period was around January 20 and she denies chance of pregnancy.  She uses ibuprofen on occasion but not regularly.  Last use of ibuprofen was with her last period.  No blood in her stool.    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     Priority: Medium    Psychophysiological insomnia 03/14/2017     Priority: Medium        Past Medical History:    Past Medical History:   Diagnosis Date    Urinary tract infection        Past Surgical History:    Past Surgical History:   Procedure Laterality Date    OTHER SURGICAL HISTORY      66587.0,PAST SURGICAL HISTORY,Unremarkable       Family History:    Family History   Problem  Relation Age of Onset    Other - See Comments Mother         Psychiatric illness,Bi-Polar/Psychiatric illness,Depression    Cerebrovascular Disease Mother     Mental Illness Father     Alcoholism Father     Breast Cancer No family hx of     Colon Cancer No family hx of        Social History:  Marital Status:  Single [1]  Social History     Tobacco Use    Smoking status: Never     Passive exposure: Past    Smokeless tobacco: Never    Tobacco comments:     Quit smoking: Mother smoked when she was younger; no longer exposed   Vaping Use    Vaping Use: Every day    Substances: THC    Devices: Pre-filled or refillable cartridge   Substance Use Topics    Alcohol use: Yes     Alcohol/week: 0.0 standard drinks of alcohol     Comment: Alcoholic Drinks/day: 1-2 glasses of wine per month    Drug use: Never        Medications:    pantoprazole (PROTONIX) 40 MG EC tablet  drospirenone-ethinyl estradiol (ALLI) 3-0.03 MG tablet  FLUoxetine (PROZAC) 20 MG capsule  gabapentin (NEURONTIN) 300 MG capsule  hydrocortisone (CORTAID) 1 % external cream  ibuprofen (ADVIL/MOTRIN) 200 MG tablet  melatonin 5 MG tablet  vitamin D3 (CHOLECALCIFEROL) 1.25 MG (65089 UT) capsule  Vitamin D3 (VITAMIN D, CHOLECALCIFEROL,) 25 mcg (1000 units) tablet          Review of Systems   Constitutional:  Positive for fatigue. Negative for chills, diaphoresis and fever.   Respiratory:  Negative for cough, choking and shortness of breath.    Cardiovascular:  Negative for chest pain.   Gastrointestinal:  Positive for abdominal pain, diarrhea and nausea. Negative for abdominal distention and vomiting.   Genitourinary:  Negative for flank pain, frequency, pelvic pain, urgency, vaginal bleeding, vaginal discharge and vaginal pain.   Musculoskeletal:  Negative for joint swelling.   Skin:  Negative for rash.       Physical Exam   BP: 134/85  Pulse: 97  Temp: 98.8  F (37.1  C)  Resp: 20  Weight: 67.6 kg (149 lb)  SpO2: 97 %      Physical Exam  Vitals and nursing note  reviewed. Exam conducted with a chaperone present.   Constitutional:       General: She is not in acute distress.     Appearance: Normal appearance. She is well-developed. She is not diaphoretic.   HENT:      Head: Normocephalic and atraumatic.      Mouth/Throat:      Mouth: Mucous membranes are moist.   Eyes:      General: No scleral icterus.     Conjunctiva/sclera: Conjunctivae normal.   Cardiovascular:      Rate and Rhythm: Normal rate and regular rhythm.      Heart sounds: Normal heart sounds.   Pulmonary:      Effort: Pulmonary effort is normal. No respiratory distress.      Breath sounds: Normal breath sounds.   Abdominal:      General: Abdomen is flat. Bowel sounds are normal. There is no distension.      Palpations: Abdomen is soft.      Tenderness: There is abdominal tenderness in the epigastric area.   Musculoskeletal:      Cervical back: Neck supple.   Skin:     General: Skin is warm.      Findings: No rash.   Neurological:      General: No focal deficit present.      Mental Status: She is alert.         ED Course              ED Course as of 02/05/24 1913 Mon Feb 05, 2024 1811 Patient with continued abdominal pain.  She was seen in clinic yesterday.  Lab work, urinalysis, x-ray imaging were all done and reassuring and unremarkable.  She called the clinic today and given her increasing abdominal symptoms they recommend she come in for further evaluation.     Procedures              Critical Care time:  none               No results found for this or any previous visit (from the past 24 hour(s)).    Medications   famotidine (PEPCID) tablet 20 mg (20 mg Oral $Given 2/5/24 1831)   pantoprazole (PROTONIX) EC tablet 40 mg (40 mg Oral $Given 2/5/24 1831)   alum & mag hydroxide-simethicone (MAALOX) suspension 15 mL (15 mLs Oral $Given 2/5/24 1831)       Assessments & Plan (with Medical Decision Making)     I have reviewed the nursing notes.    I have reviewed the findings, diagnosis, plan and need for follow  up with the patient.           Medical Decision Making  The patient's presentation was of moderate complexity (an undiagnosed new problem with uncertain diagnosis).    The patient's evaluation involved:  ordering and/or review of 1 test(s) in this encounter (see separate area of note for details)    The patient's management necessitated moderate risk (prescription drug management including medications given in the ED).        New Prescriptions    PANTOPRAZOLE (PROTONIX) 40 MG EC TABLET    Take 1 tablet (40 mg) by mouth daily       Final diagnoses:   Epigastric pain   Etiology unclear.  Could be gastritis versus peptic ulcer disease.  H. pylori is pending.  Recommend trial of Protonix.  Have sent this into the pharmacy.  If her right upper quadrant ultrasound is normal okay to discharge.  I have discussed this with Dr. Erik Amezcua at shift change.  If there are other findings on ultrasound he will continue to pursue further workup as appropriate.  Presumably this will be normal.  If H. pylori comes back negative [facility] will follow-up and treat her.  If she has ongoing symptoms despite use of Protonix and all her testing is negative would recommend referral to GI for possible EGD.  Patient and her grandmother understand and agree with the plan as listed.    2/5/2024   [facility]       Lashell Ontiveros DO  02/05/24 1914

## 2024-02-06 NOTE — ED PROVIDER NOTES
Transfer of care from previous shift provider:. Epigastric pain seen in clinic yesterday with reassuring labs with h pylori testing pending, and recommended follow up with imaging. RUQ US pending. Given PPI, gi cocktail, pepcid and prescribed PPI for discharge.    ED Course as of 02/05/24 1913 Mon Feb 05, 2024 1811 Patient with continued abdominal pain.  She was seen in clinic yesterday.  Lab work, urinalysis, x-ray imaging were all done and reassuring and unremarkable.  She called the clinic today and given her increasing abdominal symptoms they recommend she come in for further evaluation.     Assessment and Plan:  Final diagnoses:   Epigastric pain      Negative RUQ US. Symptoms waxing/waning past 1 month associated with postprandial discomfort. H pylori pending. Has PCP follow up scheduled. Comfortable with discharge home. All questions/concerns addressed. Disposition: home with attached instructions on diagnosis provided including ED return precautions.       William Amezcua MD  02/05/24 2010

## 2024-02-06 NOTE — DISCHARGE INSTRUCTIONS
Ultrasound is normal.  You may have H. pylori which is a bacteria that sometimes can grow in the stomach and colon and cause the symptoms that you are having.  This is diagnosed with a stool test that you have left.  It may take a few days for us to get the final results on this.  In the meantime you can use over-the-counter medication such as Protonix or Pepcid.  If you do have H. pylori part of the treatment will be to take medications similar to this along with an antibiotic.

## 2024-02-07 ENCOUNTER — OFFICE VISIT (OUTPATIENT)
Dept: FAMILY MEDICINE | Facility: OTHER | Age: 29
End: 2024-02-07
Attending: PHYSICIAN ASSISTANT

## 2024-02-07 VITALS
HEART RATE: 68 BPM | WEIGHT: 150 LBS | SYSTOLIC BLOOD PRESSURE: 104 MMHG | RESPIRATION RATE: 20 BRPM | TEMPERATURE: 98 F | DIASTOLIC BLOOD PRESSURE: 66 MMHG | HEIGHT: 62 IN | BODY MASS INDEX: 27.6 KG/M2 | OXYGEN SATURATION: 99 %

## 2024-02-07 DIAGNOSIS — F33.1 MODERATE EPISODE OF RECURRENT MAJOR DEPRESSIVE DISORDER (H): ICD-10-CM

## 2024-02-07 DIAGNOSIS — F41.1 GAD (GENERALIZED ANXIETY DISORDER): ICD-10-CM

## 2024-02-07 DIAGNOSIS — K21.00 GASTROESOPHAGEAL REFLUX DISEASE WITH ESOPHAGITIS, UNSPECIFIED WHETHER HEMORRHAGE: Primary | ICD-10-CM

## 2024-02-07 PROCEDURE — 99214 OFFICE O/P EST MOD 30 MIN: CPT | Performed by: PHYSICIAN ASSISTANT

## 2024-02-07 RX ORDER — SUCRALFATE 1 G/1
1 TABLET ORAL 3 TIMES DAILY PRN
Qty: 90 TABLET | Refills: 0 | Status: SHIPPED | OUTPATIENT
Start: 2024-02-07

## 2024-02-07 ASSESSMENT — PATIENT HEALTH QUESTIONNAIRE - PHQ9
SUM OF ALL RESPONSES TO PHQ QUESTIONS 1-9: 22
SUM OF ALL RESPONSES TO PHQ QUESTIONS 1-9: 22
10. IF YOU CHECKED OFF ANY PROBLEMS, HOW DIFFICULT HAVE THESE PROBLEMS MADE IT FOR YOU TO DO YOUR WORK, TAKE CARE OF THINGS AT HOME, OR GET ALONG WITH OTHER PEOPLE: VERY DIFFICULT

## 2024-02-07 ASSESSMENT — PAIN SCALES - GENERAL: PAINLEVEL: SEVERE PAIN (7)

## 2024-02-07 ASSESSMENT — COLUMBIA-SUICIDE SEVERITY RATING SCALE - C-SSRS
2. IN THE PAST MONTH, HAVE YOU ACTUALLY HAD ANY THOUGHTS OF KILLING YOURSELF?: NO
6. HAVE YOU EVER DONE ANYTHING, STARTED TO DO ANYTHING, OR PREPARED TO DO ANYTHING TO END YOUR LIFE?: NO
1. WITHIN THE PAST MONTH, HAVE YOU WISHED YOU WERE DEAD OR WISHED YOU COULD GO TO SLEEP AND NOT WAKE UP?: YES

## 2024-02-07 NOTE — PATIENT INSTRUCTIONS
Continue protonix daily for 3 months    Start Carafate as needed for severe pain or nausea, you can take 1 tablet up to 3x daily

## 2024-02-07 NOTE — PROGRESS NOTES
"  Assessment & Plan       ICD-10-CM    1. Gastroesophageal reflux disease with esophagitis, unspecified whether hemorrhage  K21.00 sucralfate (CARAFATE) 1 GM tablet      2. ARNOLD (generalized anxiety disorder)  F41.1       3. Moderate episode of recurrent major depressive disorder (H)  F33.1         Right upper quadrant pain and epigastric pain, findings are most compatible with acid reflux.  Recommend to limit intake of acidic, greasy, spicy and caffeinated foods.  Continue Protonix 20 mg daily, I would like to trial this for 8 to 12 weeks in addition to lifestyle modifications.  We also elevate head of the bed to help facilitate drainage.  A prescription for Carafate 1 g was sent through for as needed use, may take up to 3 times daily as needed for reflux symptoms.  Reviewed risk, benefits potential side effects of medication.  Return precautions reviewed.  Generalized anxiety disorder, recently worsened due to life changes or stressors, recent break-up.  Has a good support system counseling/therapist and medication management provider.  She is working on scheduling a visit with Yamini Bermudez in the upcoming days/weeks.  She is aware of crisis line information/211 information  Major depression, recently exacerbated due to break-up.  Will be trying to move in with grandmother/other housing situation.  Declines suicidal or homicidal ideation.  No reports of abuse.  Has stable relationships around her.  Recommend ongoing follow-up with therapy and medication management provider.  Return precautions reviewed. Patient is in agreement and understanding of the above treatment plan. All questions and concerns were addressed and answered to patient's satisfaction. AVS reviewed with patient.     BMI  Estimated body mass index is 27.22 kg/m  as calculated from the following:    Height as of this encounter: 1.581 m (5' 2.25\").    Weight as of this encounter: 68 kg (150 lb).   Weight management plan: Discussed healthy diet and " exercise guidelines    Depression Screening Follow Up        2/7/2024     8:46 AM   PHQ   PHQ-9 Total Score 22   Q9: Thoughts of better off dead/self-harm past 2 weeks Several days   F/U: Thoughts of suicide or self-harm Yes   F/U: Self harm-plan No   F/U: Self-harm action Yes   F/U: Safety concerns Yes         2/7/2024     8:46 AM   Last PHQ-9   1.  Little interest or pleasure in doing things 3   2.  Feeling down, depressed, or hopeless 3   3.  Trouble falling or staying asleep, or sleeping too much 3   4.  Feeling tired or having little energy 3   5.  Poor appetite or overeating 3   6.  Feeling bad about yourself 3   7.  Trouble concentrating 3   8.  Moving slowly or restless 0   Q9: Thoughts of better off dead/self-harm past 2 weeks 1   PHQ-9 Total Score 22   In the past two weeks have you had thoughts of suicide or self harm? Yes   Do you have concerns about your personal safety or the safety of others? Yes   In the past 2 weeks have you thought about a plan or had intention to harm yourself? No   In the past 2 weeks have you acted on these thoughts in any way? Yes         2/7/2024     9:00 AM   C-SSRS (Brief [place])   Within the last month, have you wished you were dead or wished you could go to sleep and not wake up? Yes   Within the last month, have you had any actual thoughts of killing yourself? No   Within the last month, have you ever done anything, started to do anything, or prepared to do anything to end your life? No     Follow Up   Follow Up Actions Taken  Crisis resource information provided in the After Visit Summary  Referred patient back to mental health provider    Discussed the following ways the patient can remain in a safe environment:  be around others    See Patient Instructions    Return in about 4 weeks (around 3/6/2024) for Medication follow up - Carafate.    Subjective   Deysi is a 28 year old, presenting for the following health issues:  Clinic Care Coordination - Follow-up  (Stomach pain)        2/7/2024     8:52 AM   Additional Questions   Roomed by aileen calderón lpn     History of Present Illness       Reason for visit:  Stomach pain    She eats 0-1 servings of fruits and vegetables daily.She consumes 1 sweetened beverage(s) daily.She exercises with enough effort to increase her heart rate 9 or less minutes per day.  She exercises with enough effort to increase her heart rate 3 or less days per week. She is missing 1 dose(s) of medications per week.     Deysi presents today to discuss the following:  Right upper quadrant pain, intermittent for the last 3 to 4 weeks, possibly a few months or intensity.  Pain is worse with eating (unsure of any triggers such as acidic/spicy foods, greasy foods, etc.).  [facility] on 2/4/2024 essentially normal workup, H. pylori negative.  Due to progressive symptoms he was seen in the ER on 2/5/2024, right upper quadrant ultrasound was reassuring, no cholelithiasis or other abnormalities noted.  She was started on Protonix 40 mg daily.  Today, she states that the symptoms have slightly improved with addition of Protonix.  She declines any fevers, chills, blood or mucus in the stool, nausea, vomiting and or diarrhea.  No history of abdominal surgeries.  Mental health, lower lately, recently had a break-up last week with her boyfriend of 6 years, they live together.  She has decreased energy, difficulty sleeping and is overall feeling lower than usual.  She follows with Rachel Lucas for counseling/therapy, she is seeing her weekly, they have a good relationship.  For medication management, she follows with Yamini Bermudez of [facility], currently on fluoxetine 20 mg daily, for the most part, manages symptoms well.  She also has a good support system within her grandmother.  She does state that she feels more tearful.  She does report feeling unsafe in her current living situation, hoping to move in with her grandmother in the upcoming  "days.  No physical or emotional abuse.  No suicidal or homicidal ideation.      Review of Systems  Constitutional, HEENT, cardiovascular, pulmonary, GI, , musculoskeletal, neuro, skin, endocrine and psych systems are negative, except as otherwise noted.        Objective    /66 (BP Location: Right arm, Patient Position: Sitting, Cuff Size: Adult Regular)   Pulse 68   Temp 98  F (36.7  C) (Tympanic)   Resp 20   Ht 1.581 m (5' 2.25\")   Wt 68 kg (150 lb)   LMP 01/20/2024 (Exact Date)   SpO2 99%   BMI 27.22 kg/m    Body mass index is 27.22 kg/m .  Physical Exam   GENERAL: alert and no distress  EYES: Eyes grossly normal to inspection, PERRL and conjunctivae and sclerae normal  NECK: no adenopathy, no asymmetry, masses, or scars  RESP: lungs clear to auscultation - no rales, rhonchi or wheezes  CV: regular rate and rhythm, normal S1 S2, no S3 or S4, no murmur, click or rub, no peripheral edema  ABDOMEN: tenderness epigastric and RUQ, no organomegaly or masses, bowel sounds normal, no palpable or pulsatile masses, and no bruits heard.  Negative rebound, referred rebound.  No rebound or referred rebound tenderness.  Negative Mendoza sign, Rovsing, McBurney point, psoas signs.  MS: no gross musculoskeletal defects noted, no edema  SKIN: no suspicious lesions or rashes  PSYCH: mentation appears normal, affect normal/bright  LYMPH: normal ant/post cervical, supraclavicular nodes    US Abdomen Limited    Result Date: 2/5/2024  PROCEDURES: US ABDOMEN LIMITED HISTORY: Female, age 28 years, epigastric pain TECHNIQUE: Ultrasound of the upper abdomen was performed. COMPARISON: No relevant prior imaging. FINDINGS: LIVER: Normal. GALLBLADDER: Normal. Common bile duct diameter: 3.2 mm. ABDOMINAL AORTA AND IVC: The visualized portions of the abdominal aorta are unremarkable. PANCREAS:The visualized portions of the pancreas are normal. RIGHT KIDNEY: The right kidney is normal. The right kidney measures 8.7 centimeters in " length. OTHER: There is no free fluid in the upper abdomen.     IMPRESSION: Unremarkable examination. JEANMARIE CORONADO MD   SYSTEM ID:  [id number]    XR Abdomen 1 View    Result Date: 2/4/2024  PROCEDURE: XR ABDOMEN 1 VIEW 2/4/2024 12:39 PM HISTORY: Abdominal pain, epigastric COMPARISONS: None. TECHNIQUE: 1 view FINDINGS: The intestinal gas pattern is normal. There is no extraluminal gas or pathologic intra-abdominal calcifications.        IMPRESSION: Normal abdominal gas pattern MANUEL BOB MD   SYSTEM ID:  [id number]         Signed Electronically by: Jessica Rollins PA-C

## 2024-02-07 NOTE — NURSING NOTE
"Patient presents to the clinic for follow up with [facility].    FOOD SECURITY SCREENING QUESTIONS:    The next two questions are to help us understand your food security.  If you are feeling you need any assistance in this area, we have resources available to support you today.    Hunger Vital Signs:  Within the past 12 months we worried whether our food would run out before we got money to buy more. Never  Within the past 12 months the food we bought just didn't last and we didn't have money to get more. Never    Chief Complaint   Patient presents with    Clinic Care Coordination - Follow-up     Stomach pain       Initial /66 (BP Location: Right arm, Patient Position: Sitting, Cuff Size: Adult Regular)   Pulse 68   Temp 98  F (36.7  C) (Tympanic)   Resp 20   Ht 1.581 m (5' 2.25\")   Wt 68 kg (150 lb)   LMP 01/20/2024 (Exact Date)   SpO2 99%   BMI 27.22 kg/m   Estimated body mass index is 27.22 kg/m  as calculated from the following:    Height as of this encounter: 1.581 m (5' 2.25\").    Weight as of this encounter: 68 kg (150 lb).  Medication Reconciliation: complete        Pastora Diaz LPN     "

## 2024-02-24 ENCOUNTER — HEALTH MAINTENANCE LETTER (OUTPATIENT)
Age: 29
End: 2024-02-24

## 2024-03-04 DIAGNOSIS — K21.00 GASTROESOPHAGEAL REFLUX DISEASE WITH ESOPHAGITIS WITHOUT HEMORRHAGE: Primary | ICD-10-CM

## 2024-03-04 RX ORDER — PANTOPRAZOLE SODIUM 40 MG/1
40 TABLET, DELAYED RELEASE ORAL DAILY
Qty: 30 TABLET | Refills: 0 | Status: SHIPPED | OUTPATIENT
Start: 2024-03-04

## 2024-03-19 ENCOUNTER — HOSPITAL ENCOUNTER (EMERGENCY)
Facility: OTHER | Age: 29
Discharge: HOME | End: 2024-03-19
Payer: COMMERCIAL

## 2024-03-19 VITALS
SYSTOLIC BLOOD PRESSURE: 110 MMHG | TEMPERATURE: 98.7 F | RESPIRATION RATE: 18 BRPM | OXYGEN SATURATION: 97 % | BODY MASS INDEX: 26.31 KG/M2 | WEIGHT: 145 LBS | DIASTOLIC BLOOD PRESSURE: 66 MMHG | HEART RATE: 83 BPM

## 2024-03-19 DIAGNOSIS — M79.661 PAIN OF RIGHT LOWER LEG: ICD-10-CM

## 2024-03-19 PROCEDURE — 99282 EMERGENCY DEPT VISIT SF MDM: CPT

## 2024-03-19 ASSESSMENT — COLUMBIA-SUICIDE SEVERITY RATING SCALE - C-SSRS
1. IN THE PAST MONTH, HAVE YOU WISHED YOU WERE DEAD OR WISHED YOU COULD GO TO SLEEP AND NOT WAKE UP?: NO
2. HAVE YOU ACTUALLY HAD ANY THOUGHTS OF KILLING YOURSELF IN THE PAST MONTH?: NO
6. HAVE YOU EVER DONE ANYTHING, STARTED TO DO ANYTHING, OR PREPARED TO DO ANYTHING TO END YOUR LIFE?: NO

## 2024-03-19 ASSESSMENT — ACTIVITIES OF DAILY LIVING (ADL)
ADLS_ACUITY_SCORE: 37
ADLS_ACUITY_SCORE: 37

## 2024-03-20 ASSESSMENT — ENCOUNTER SYMPTOMS: MYALGIAS: 1

## 2024-03-20 NOTE — ED PROVIDER NOTES
History     Chief Complaint   Patient presents with    Leg Injury     The history is provided by the patient and medical records.     Deysi Moss is a 28 year old female who presents to the ER today with posterior right calf pain that started yesterday after running on the treadmill. She was able to run about 2 miles yesterday with mild discomfort. Her calf was still bothering her some this evening when she got on the treadmill. She was able to run about a mile and a half when she started to experience worsening calf pain, causing her to stop. She got off the treadmill and was having to limp.  She came here right afterwards. She did not take anything for pain. She is able to walk on the leg, though it does bother her. She denies trauma to the leg, falls, twisting injury, popping. Denies history of blood clots.     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     Priority: Medium    Psychophysiological insomnia 03/14/2017     Priority: Medium        Past Medical History:    Past Medical History:   Diagnosis Date    Urinary tract infection        Past Surgical History:    Past Surgical History:   Procedure Laterality Date    OTHER SURGICAL HISTORY      78402.0,PAST SURGICAL HISTORY,Unremarkable       Family History:    Family History   Problem Relation Age of Onset    Other - See Comments Mother         Psychiatric illness,Bi-Polar/Psychiatric illness,Depression    Cerebrovascular Disease Mother     Mental Illness Father     Alcoholism  Father     Breast Cancer No family hx of     Colon Cancer No family hx of        Social History:  Marital Status:  Single [1]  Social History     Tobacco Use    Smoking status: Never     Passive exposure: Past    Smokeless tobacco: Never    Tobacco comments:     Quit smoking: Mother smoked when she was younger; no longer exposed   Vaping Use    Vaping Use: Every day    Substances: THC    Devices: Pre-filled or refillable cartridge   Substance Use Topics    Alcohol use: Yes     Alcohol/week: 0.0 standard drinks of alcohol     Comment: Alcoholic Drinks/day: 1-2 glasses of wine per month    Drug use: Never        Medications:    drospirenone-ethinyl estradiol (ALLI) 3-0.03 MG tablet  FLUoxetine (PROZAC) 20 MG capsule  gabapentin (NEURONTIN) 300 MG capsule  hydrocortisone (CORTAID) 1 % external cream  ibuprofen (ADVIL/MOTRIN) 200 MG tablet  melatonin 5 MG tablet  pantoprazole (PROTONIX) 40 MG EC tablet  sucralfate (CARAFATE) 1 GM tablet  vitamin D3 (CHOLECALCIFEROL) 1.25 MG (90960 UT) capsule  Vitamin D3 (VITAMIN D, CHOLECALCIFEROL,) 25 mcg (1000 units) tablet        Review of Systems   Musculoskeletal:  Positive for myalgias.        Right lower leg   All other systems reviewed and are negative.      Physical Exam   BP: 110/66  Pulse: 83  Temp: 98.7  F (37.1  C)  Resp: 18  Weight: 65.8 kg (145 lb)  SpO2: 97 %      Physical Exam  Vitals and nursing note reviewed.   Constitutional:       General: She is not in acute distress.     Appearance: Normal appearance.   Cardiovascular:      Rate and Rhythm: Normal rate and regular rhythm.   Pulmonary:      Effort: Pulmonary effort is normal.   Musculoskeletal:      Right hip: Normal.      Left hip: Normal.      Right lower leg: Tenderness present. No swelling, deformity, lacerations or bony tenderness. No edema.      Left lower leg: Normal.      Comments: Right lower extremity: ROM intact. Plantar and dorsal flexion intact. Pedal pulses intact. Skin is pink, warm. Mild  posterior leg pain with palpation. No increased warmth. No erythema. No bruising.    Skin:     General: Skin is warm.      Capillary Refill: Capillary refill takes less than 2 seconds.   Neurological:      General: No focal deficit present.      Mental Status: She is alert.   Psychiatric:         Mood and Affect: Mood normal.         Behavior: Behavior is cooperative.         ED Course       No results found for this or any previous visit (from the past 24 hour(s)).    Medications - No data to display    Assessments & Plan (with Medical Decision Making)  Temp: 98.7  F (37.1  C)   BP: 110/66 Pulse: 83   Resp: 18 SpO2: 97 % O2 Device: None (Room air)    vitals stable.   Physical exam: Patient is alert, orientated, non-distressed. ROM to right lower extremity intact, she is able to bend and extend her knee. No knee swelling.  No swelling, edema, bruising, warmth, deformity.  No numbness, tingling- neurological deficits No concern for compartment syndrome. Garcia test negative. No pain over achilles tendon or palpable tendon defect. Some calf pain with plantar flexion.   Low suspicion for DVT (no hx of clots, no recent surgery, immobility, cancer; Wells negative) due to mechanism of injury: pain starting with running yesterday and is exacerbated with running today.    Meds: offered tylenol, ibuprofen; patient declined reports she has some at home. She is given ice here.   Plan: Acute Lower leg pain, symptoms and history consistent with a strain. Ice, tylenol, ibuprofen, refrain from running if experiencing pain. Discussed  Follow up with primary care if symptoms persist. Discussed returning to be seen if symptoms worsen. Patient gave verbal understanding of information.      I have reviewed the nursing notes.    I have reviewed the findings, diagnosis, plan and need for follow up with the patient.    Medical Decision Making  The patient's presentation was of straightforward complexity (a clearly self-limited or minor  problem).    The patient's evaluation involved:  history and exam without other MDM data elements    The patient's management necessitated only low risk treatment.      Discharge Medication List as of 3/19/2024  9:46 PM          Final diagnoses:   Pain of right lower leg       3/19/2024   [facility] AND [facility] Pretty, LUKE CNP  03/20/24 0005

## 2024-03-20 NOTE — DISCHARGE INSTRUCTIONS
Deysi  Posterior calf injuries are common and occur in both competitive and recreational athletes as well as active laborers. The major posterior calf muscles include the gastrocnemius, soleus, popliteal, and plantaris muscles. These muscles primarily perform active plantarflexion of the ankle and are typically injured during ballistic movements. Patients with posterior calf injuries present with limping, swelling of the posterior calf, and significant pain at the time of injury. Calf muscle strains usually occur when the muscles are not warmed up properly or have fatigued significantly during exercise. Approximately 20 percent of patients report prodromal symptoms including soreness or tightness in their calf muscle prior to the injury.  Tylenol, ibuprofen, ice as needed.   Avoid stretching initially, avoid running if you are experiencing pain.     Your injury today is consistent with a muscle strain.   Please return if  symptoms worsen such as swelling, increased pain, numbness, tingling.   Follow up with primary care if symptoms persist.

## 2024-03-20 NOTE — ED TRIAGE NOTES
Pt arrives with concerns of a right leg injury that started yesterday when she was on the treadmill. Pt isnt able to bear full weight on the leg anymore and states the pain is traveling from her calf to her knee. CMS WDL.   /66   Pulse 83   Temp 98.7  F (37.1  C)   Resp 18   Wt 65.8 kg (145 lb)   LMP 01/20/2024 (Exact Date)   SpO2 97%   BMI 26.31 kg/m         Triage Assessment (Adult)       Row Name 03/19/24 2026          Triage Assessment    Airway WDL WDL        Respiratory WDL    Respiratory WDL WDL        Skin Circulation/Temperature WDL    Skin Circulation/Temperature WDL WDL        Cardiac WDL    Cardiac WDL WDL        Peripheral/Neurovascular WDL    Peripheral Neurovascular WDL X        Cognitive/Neuro/Behavioral WDL    Cognitive/Neuro/Behavioral WDL WDL

## 2024-08-02 DIAGNOSIS — Z30.09 BIRTH CONTROL COUNSELING: ICD-10-CM

## 2024-08-05 RX ORDER — DROSPIRENONE AND ETHINYL ESTRADIOL 0.03MG-3MG
1 KIT ORAL DAILY
Qty: 28 TABLET | Refills: 0 | Status: SHIPPED | OUTPATIENT
Start: 2024-08-05

## 2024-11-11 ENCOUNTER — OFFICE VISIT (OUTPATIENT)
Dept: URGENT CARE | Facility: URGENT CARE | Age: 29
End: 2024-11-11

## 2024-11-11 VITALS
OXYGEN SATURATION: 99 % | SYSTOLIC BLOOD PRESSURE: 115 MMHG | BODY MASS INDEX: 28.85 KG/M2 | DIASTOLIC BLOOD PRESSURE: 76 MMHG | WEIGHT: 159 LBS | TEMPERATURE: 97.7 F | HEART RATE: 73 BPM | RESPIRATION RATE: 18 BRPM

## 2024-11-11 DIAGNOSIS — J02.9 ACUTE PHARYNGITIS, UNSPECIFIED ETIOLOGY: Primary | ICD-10-CM

## 2024-11-11 LAB — DEPRECATED S PYO AG THROAT QL EIA: NEGATIVE

## 2024-11-11 PROCEDURE — 87651 STREP A DNA AMP PROBE: CPT | Performed by: PHYSICIAN ASSISTANT

## 2024-11-11 PROCEDURE — 99213 OFFICE O/P EST LOW 20 MIN: CPT | Performed by: PHYSICIAN ASSISTANT

## 2024-11-11 ASSESSMENT — PAIN SCALES - GENERAL: PAINLEVEL_OUTOF10: MODERATE PAIN (4)

## 2024-11-12 LAB — GROUP A STREP BY PCR: NOT DETECTED

## 2024-11-12 NOTE — PROGRESS NOTES
Assessment & Plan     Acute pharyngitis, unspecified etiology  - Streptococcus A Rapid Screen w/Reflex to PCR - Clinic Collect  - Group A Streptococcus PCR Throat Swab      We discussed symptomatic measures including fluids, rest, Tylenol, ibuprofen, honey.  Follow-up to be seen if symptoms worsen or fail to improve.    Return in about 1 week (around 11/18/2024) for visit with primary care provider if not improving.     ENRRIQUE Jo [facility]    Subjective   Deysi Moss is a 28 year old who presents for the following health issues     Patient presents with:  Pharyngitis: Sore throat, started 4 days ago       HPI    Deysi presents clinic today for evaluation of a sore throat.  Symptoms first began 4 days ago.  Symptoms have been flow, it seems like symptoms were at their worst Saturday night and yesterday, improving today.  She has a cough that feels it is coming from her throat, wet sounding.  No shortness of breath but some pressure in her chest.  Nasal congestion with some postnasal drainage.  No fever.    Review of Systems   ROS negative except as stated above.      Objective    /76 (BP Location: Left arm, Patient Position: Sitting, Cuff Size: Adult Regular)   Pulse 73   Temp 97.7  F (36.5  C) (Tympanic)   Resp 18   Wt 72.1 kg (159 lb)   LMP 10/25/2024 (Approximate)   SpO2 99%   BMI 28.85 kg/m    Physical Exam   GENERAL: alert and no distress  EYES: Eyes grossly normal to inspection, PERRL and conjunctivae and sclerae normal  HENT: ear canals and TM's normal, nose and mouth without ulcers or lesions  NECK: no adenopathy, no asymmetry, masses, or scars  RESP: lungs clear to auscultation - no rales, rhonchi or wheezes  CV: regular rate and rhythm, normal S1 S2, no S3 or S4, no murmur, click or rub, no peripheral edema    Results for orders placed or performed in visit on 11/11/24   Streptococcus A Rapid Screen w/Reflex to PCR - Clinic Collect     Status:  Normal    Specimen: Throat; Swab   Result Value Ref Range    Group A Strep antigen Negative Negative

## 2024-11-21 NOTE — TELEPHONE ENCOUNTER
Elijah sent Rx request for the following:      Requested Prescriptions   Pending Prescriptions Disp Refills    drospirenone-ethinyl estradiol (ALLI) 3-0.03 MG tablet 84 tablet 3     Sig: Take 1 tablet by mouth daily as directed       Contraceptives Protocol Failed - 8/2/2024  3:40 PM        Failed - Medication indicated for associated diagnosis     Medication is associated with one or more of the following diagnoses:  Contraception  Acne  Dysmenorrhea  Menorrhagia  Amenorrhea  PCOS  Premenstrual Dysphoric Disorder  Irregular menses  Endometriosis          Passed - Patient is not a current smoker if age is 35 or older        Passed - Recent (12 mo) or future (30 days) visit within the authorizing provider's specialty     The patient must have completed an in-person or virtual visit within the past 12 months or has a future visit scheduled within the next 90 days with the authorizing provider s specialty.  Urgent care and e-visits do not quality as an office visit for this protocol.          Passed - Medication is active on med list        Passed - No active pregnancy on record        Passed - No positive pregnancy test in past 12 months             Last Prescription Date:   8/18/2023  Last Fill Qty/Refills:         84, R-3    Last Office Visit:              8/18/2023 [facility]   Future Office visit:                Unable to complete prescription refill per RN Medication Refill Policy.  Patient due for annual birth control check. Patti olivarez teed up. Routing to scheduling to assist setting up visit.    Sofie Yanez RN on 8/2/2024 at 3:43 PM       I have sent in Suflave

## 2025-03-09 ENCOUNTER — HEALTH MAINTENANCE LETTER (OUTPATIENT)
Age: 30
End: 2025-03-09

## (undated) RX ORDER — FAMOTIDINE 20 MG/1
TABLET, FILM COATED ORAL
Status: DISPENSED
Start: 2024-02-05

## (undated) RX ORDER — MAGNESIUM HYDROXIDE/ALUMINUM HYDROXICE/SIMETHICONE 120; 1200; 1200 MG/30ML; MG/30ML; MG/30ML
SUSPENSION ORAL
Status: DISPENSED
Start: 2024-02-05

## (undated) RX ORDER — PANTOPRAZOLE SODIUM 40 MG/1
TABLET, DELAYED RELEASE ORAL
Status: DISPENSED
Start: 2024-02-05

## (undated) RX ORDER — MAGNESIUM HYDROXIDE/ALUMINUM HYDROXICE/SIMETHICONE 120; 1200; 1200 MG/30ML; MG/30ML; MG/30ML
SUSPENSION ORAL
Status: DISPENSED
Start: 2024-02-04